# Patient Record
Sex: MALE | Race: WHITE | NOT HISPANIC OR LATINO | Employment: FULL TIME | URBAN - METROPOLITAN AREA
[De-identification: names, ages, dates, MRNs, and addresses within clinical notes are randomized per-mention and may not be internally consistent; named-entity substitution may affect disease eponyms.]

---

## 2017-11-27 ENCOUNTER — TELEPHONE (OUTPATIENT)
Dept: FAMILY MEDICINE CLINIC | Facility: CLINIC | Age: 32
End: 2017-11-27

## 2017-11-27 ENCOUNTER — OFFICE VISIT (OUTPATIENT)
Dept: FAMILY MEDICINE CLINIC | Facility: CLINIC | Age: 32
End: 2017-11-27

## 2017-11-27 VITALS
DIASTOLIC BLOOD PRESSURE: 72 MMHG | WEIGHT: 227 LBS | BODY MASS INDEX: 30.75 KG/M2 | HEIGHT: 72 IN | OXYGEN SATURATION: 96 % | SYSTOLIC BLOOD PRESSURE: 120 MMHG | TEMPERATURE: 98.5 F | HEART RATE: 60 BPM

## 2017-11-27 DIAGNOSIS — R74.8 ELEVATED LIVER ENZYMES: Primary | ICD-10-CM

## 2017-11-27 DIAGNOSIS — J30.2 CHRONIC SEASONAL ALLERGIC RHINITIS, UNSPECIFIED TRIGGER: ICD-10-CM

## 2017-11-27 DIAGNOSIS — J20.9 ACUTE BRONCHITIS, UNSPECIFIED ORGANISM: Primary | ICD-10-CM

## 2017-11-27 DIAGNOSIS — J01.00 ACUTE MAXILLARY SINUSITIS, RECURRENCE NOT SPECIFIED: ICD-10-CM

## 2017-11-27 DIAGNOSIS — Z13.1 SCREENING FOR DIABETES MELLITUS: ICD-10-CM

## 2017-11-27 LAB
ALBUMIN SERPL-MCNC: 4.8 G/DL (ref 3.5–5.2)
ALBUMIN/GLOB SERPL: 1.7 G/DL
ALP SERPL-CCNC: 34 U/L (ref 39–117)
ALT SERPL W P-5'-P-CCNC: 50 U/L (ref 1–41)
ANION GAP SERPL CALCULATED.3IONS-SCNC: 11.4 MMOL/L
AST SERPL-CCNC: 21 U/L (ref 1–40)
BILIRUB SERPL-MCNC: 0.3 MG/DL (ref 0.1–1.2)
BUN BLD-MCNC: 13 MG/DL (ref 6–20)
BUN/CREAT SERPL: 12.1 (ref 7–25)
CALCIUM SPEC-SCNC: 9.6 MG/DL (ref 8.6–10.5)
CHLORIDE SERPL-SCNC: 105 MMOL/L (ref 98–107)
CO2 SERPL-SCNC: 26.6 MMOL/L (ref 22–29)
CREAT BLD-MCNC: 1.07 MG/DL (ref 0.76–1.27)
ERYTHROCYTE [DISTWIDTH] IN BLOOD BY AUTOMATED COUNT: 13.4 % (ref 4.5–15)
GFR SERPL CREATININE-BSD FRML MDRD: 80 ML/MIN/1.73
GLOBULIN UR ELPH-MCNC: 2.9 GM/DL
GLUCOSE BLD-MCNC: 101 MG/DL (ref 65–99)
HCT VFR BLD AUTO: 46.2 % (ref 35–60)
HGB BLD-MCNC: 14.6 G/DL (ref 13.5–18)
LYMPHOCYTES # BLD AUTO: 2.1 10*3/MM3 (ref 1.2–3.4)
LYMPHOCYTES NFR BLD AUTO: 31 % (ref 21–51)
MCH RBC QN AUTO: 26.8 PG (ref 26.1–33.1)
MCHC RBC AUTO-ENTMCNC: 31.7 G/DL (ref 33–37)
MCV RBC AUTO: 84.7 FL (ref 80–99)
MONOCYTES # BLD AUTO: 0.4 10*3/MM3 (ref 0.1–0.6)
MONOCYTES NFR BLD AUTO: 6.2 % (ref 2–9)
NEUTROPHILS # BLD AUTO: 4.3 10*3/MM3 (ref 1.4–6.5)
NEUTROPHILS NFR BLD AUTO: 62.8 % (ref 42–75)
PLATELET # BLD AUTO: 222 10*3/MM3 (ref 150–450)
PMV BLD AUTO: 9.1 FL (ref 7.1–10.5)
POTASSIUM BLD-SCNC: 4.7 MMOL/L (ref 3.5–5.2)
PROT SERPL-MCNC: 7.7 G/DL (ref 6–8.5)
RBC # BLD AUTO: 5.45 10*6/MM3 (ref 4–6)
SODIUM BLD-SCNC: 143 MMOL/L (ref 136–145)
WBC NRBC COR # BLD: 6.9 10*3/MM3 (ref 4.5–10)

## 2017-11-27 PROCEDURE — 99203 OFFICE O/P NEW LOW 30 MIN: CPT | Performed by: NURSE PRACTITIONER

## 2017-11-27 PROCEDURE — 80053 COMPREHEN METABOLIC PANEL: CPT | Performed by: NURSE PRACTITIONER

## 2017-11-27 PROCEDURE — 36415 COLL VENOUS BLD VENIPUNCTURE: CPT | Performed by: NURSE PRACTITIONER

## 2017-11-27 PROCEDURE — 85025 COMPLETE CBC W/AUTO DIFF WBC: CPT | Performed by: NURSE PRACTITIONER

## 2017-11-27 RX ORDER — FLUTICASONE PROPIONATE 50 MCG
SPRAY, SUSPENSION (ML) NASAL
Refills: 11 | COMMUNITY
Start: 2017-09-08

## 2017-11-27 RX ORDER — ALBUTEROL SULFATE 90 UG/1
2 AEROSOL, METERED RESPIRATORY (INHALATION) EVERY 4 HOURS PRN
Qty: 1 INHALER | Refills: 0 | Status: SHIPPED | OUTPATIENT
Start: 2017-11-27

## 2017-11-27 RX ORDER — AMOXICILLIN 875 MG/1
875 TABLET, COATED ORAL 2 TIMES DAILY
Qty: 14 TABLET | Refills: 0 | Status: SHIPPED | OUTPATIENT
Start: 2017-11-27 | End: 2018-02-15

## 2017-12-05 ENCOUNTER — TELEPHONE (OUTPATIENT)
Dept: FAMILY MEDICINE CLINIC | Facility: CLINIC | Age: 32
End: 2017-12-05

## 2017-12-05 ENCOUNTER — LAB (OUTPATIENT)
Dept: FAMILY MEDICINE CLINIC | Facility: CLINIC | Age: 32
End: 2017-12-05

## 2017-12-05 DIAGNOSIS — R74.8 ELEVATED LIVER ENZYMES: ICD-10-CM

## 2017-12-05 DIAGNOSIS — R74.8 ELEVATED LIVER ENZYMES: Primary | ICD-10-CM

## 2017-12-05 LAB
HAV IGM SERPL QL IA: NORMAL
HBV CORE IGM SERPL QL IA: NORMAL
HBV SURFACE AG SERPL QL IA: NORMAL
HCV AB SER DONR QL: NORMAL

## 2017-12-05 PROCEDURE — 36415 COLL VENOUS BLD VENIPUNCTURE: CPT | Performed by: NURSE PRACTITIONER

## 2017-12-05 PROCEDURE — 80074 ACUTE HEPATITIS PANEL: CPT | Performed by: NURSE PRACTITIONER

## 2017-12-05 RX ORDER — AZITHROMYCIN 250 MG/1
TABLET, FILM COATED ORAL
Qty: 6 TABLET | Refills: 0 | Status: SHIPPED | OUTPATIENT
Start: 2017-12-05 | End: 2018-02-15

## 2018-02-15 ENCOUNTER — OFFICE VISIT (OUTPATIENT)
Dept: FAMILY MEDICINE CLINIC | Facility: CLINIC | Age: 33
End: 2018-02-15

## 2018-02-15 VITALS
WEIGHT: 230 LBS | HEART RATE: 56 BPM | BODY MASS INDEX: 31.15 KG/M2 | HEIGHT: 72 IN | SYSTOLIC BLOOD PRESSURE: 110 MMHG | TEMPERATURE: 98.4 F | DIASTOLIC BLOOD PRESSURE: 78 MMHG | OXYGEN SATURATION: 94 %

## 2018-02-15 DIAGNOSIS — J34.89 SINUS PRESSURE: ICD-10-CM

## 2018-02-15 DIAGNOSIS — J30.2 CHRONIC SEASONAL ALLERGIC RHINITIS, UNSPECIFIED TRIGGER: ICD-10-CM

## 2018-02-15 DIAGNOSIS — R74.8 ELEVATED LIVER ENZYMES: ICD-10-CM

## 2018-02-15 DIAGNOSIS — R05.3 PERSISTENT COUGH FOR 3 WEEKS OR LONGER: Primary | ICD-10-CM

## 2018-02-15 LAB
ALBUMIN SERPL-MCNC: 4.8 G/DL (ref 3.5–5.2)
ALBUMIN/GLOB SERPL: 1.6 G/DL
ALP SERPL-CCNC: 38 U/L (ref 39–117)
ALT SERPL W P-5'-P-CCNC: 57 U/L (ref 1–41)
ANION GAP SERPL CALCULATED.3IONS-SCNC: 11.7 MMOL/L
AST SERPL-CCNC: 29 U/L (ref 1–40)
BILIRUB CONJ SERPL-MCNC: <0.2 MG/DL (ref 0–0.3)
BILIRUB SERPL-MCNC: 0.3 MG/DL (ref 0.1–1.2)
BUN BLD-MCNC: 12 MG/DL (ref 6–20)
BUN/CREAT SERPL: 11.7 (ref 7–25)
CALCIUM SPEC-SCNC: 9.9 MG/DL (ref 8.6–10.5)
CHLORIDE SERPL-SCNC: 102 MMOL/L (ref 98–107)
CO2 SERPL-SCNC: 28.3 MMOL/L (ref 22–29)
CREAT BLD-MCNC: 1.03 MG/DL (ref 0.76–1.27)
ERYTHROCYTE [DISTWIDTH] IN BLOOD BY AUTOMATED COUNT: 12.5 % (ref 4.5–15)
GFR SERPL CREATININE-BSD FRML MDRD: 83 ML/MIN/1.73
GLOBULIN UR ELPH-MCNC: 3 GM/DL
GLUCOSE BLD-MCNC: 105 MG/DL (ref 65–99)
HCT VFR BLD AUTO: 47 % (ref 35–60)
HGB BLD-MCNC: 15.6 G/DL (ref 13.5–18)
LYMPHOCYTES # BLD AUTO: 2.4 10*3/MM3 (ref 1.2–3.4)
LYMPHOCYTES NFR BLD AUTO: 27.2 % (ref 21–51)
MCH RBC QN AUTO: 27.8 PG (ref 26.1–33.1)
MCHC RBC AUTO-ENTMCNC: 33.2 G/DL (ref 33–37)
MCV RBC AUTO: 83.8 FL (ref 80–99)
MONOCYTES # BLD AUTO: 0.4 10*3/MM3 (ref 0.1–0.6)
MONOCYTES NFR BLD AUTO: 4.3 % (ref 2–9)
NEUTROPHILS # BLD AUTO: 6 10*3/MM3 (ref 1.4–6.5)
NEUTROPHILS NFR BLD AUTO: 68.5 % (ref 42–75)
PLATELET # BLD AUTO: 266 10*3/MM3 (ref 150–450)
PMV BLD AUTO: 8 FL (ref 7.1–10.5)
POTASSIUM BLD-SCNC: 4.7 MMOL/L (ref 3.5–5.2)
PROT SERPL-MCNC: 7.8 G/DL (ref 6–8.5)
RBC # BLD AUTO: 5.61 10*6/MM3 (ref 4–6)
SODIUM BLD-SCNC: 142 MMOL/L (ref 136–145)
WBC NRBC COR # BLD: 8.8 10*3/MM3 (ref 4.5–10)

## 2018-02-15 PROCEDURE — 82248 BILIRUBIN DIRECT: CPT | Performed by: NURSE PRACTITIONER

## 2018-02-15 PROCEDURE — 85025 COMPLETE CBC W/AUTO DIFF WBC: CPT | Performed by: NURSE PRACTITIONER

## 2018-02-15 PROCEDURE — 80053 COMPREHEN METABOLIC PANEL: CPT | Performed by: NURSE PRACTITIONER

## 2018-02-15 PROCEDURE — 99213 OFFICE O/P EST LOW 20 MIN: CPT | Performed by: NURSE PRACTITIONER

## 2018-02-15 PROCEDURE — 36415 COLL VENOUS BLD VENIPUNCTURE: CPT | Performed by: NURSE PRACTITIONER

## 2018-02-15 PROCEDURE — 71046 X-RAY EXAM CHEST 2 VIEWS: CPT | Performed by: NURSE PRACTITIONER

## 2018-02-15 RX ORDER — CEFUROXIME AXETIL 250 MG/1
250 TABLET ORAL 2 TIMES DAILY
Qty: 20 TABLET | Refills: 0 | Status: SHIPPED | OUTPATIENT
Start: 2018-02-15 | End: 2019-12-28

## 2018-02-15 RX ORDER — MONTELUKAST SODIUM 10 MG/1
10 TABLET ORAL NIGHTLY
Qty: 30 TABLET | Refills: 1 | Status: SHIPPED | OUTPATIENT
Start: 2018-02-15 | End: 2018-04-19 | Stop reason: SDUPTHER

## 2018-02-15 RX ORDER — BENZONATATE 100 MG/1
100 CAPSULE ORAL 3 TIMES DAILY PRN
Qty: 21 CAPSULE | Refills: 0 | Status: SHIPPED | OUTPATIENT
Start: 2018-02-15 | End: 2019-12-28

## 2018-02-16 ENCOUNTER — TELEPHONE (OUTPATIENT)
Dept: FAMILY MEDICINE CLINIC | Facility: CLINIC | Age: 33
End: 2018-02-16

## 2018-02-19 ENCOUNTER — TELEPHONE (OUTPATIENT)
Dept: FAMILY MEDICINE CLINIC | Facility: CLINIC | Age: 33
End: 2018-02-19

## 2018-02-19 DIAGNOSIS — R79.89 ELEVATED LFTS: Primary | ICD-10-CM

## 2018-04-19 RX ORDER — MONTELUKAST SODIUM 10 MG/1
10 TABLET ORAL NIGHTLY
Qty: 30 TABLET | Refills: 1 | Status: SHIPPED | OUTPATIENT
Start: 2018-04-19

## 2018-05-09 ENCOUNTER — CLINICAL SUPPORT (OUTPATIENT)
Dept: FAMILY MEDICINE CLINIC | Facility: CLINIC | Age: 33
End: 2018-05-09

## 2018-05-09 PROCEDURE — 90471 IMMUNIZATION ADMIN: CPT | Performed by: NURSE PRACTITIONER

## 2018-05-09 PROCEDURE — 90632 HEPA VACCINE ADULT IM: CPT | Performed by: NURSE PRACTITIONER

## 2018-11-07 ENCOUNTER — CLINICAL SUPPORT (OUTPATIENT)
Dept: FAMILY MEDICINE CLINIC | Facility: CLINIC | Age: 33
End: 2018-11-07

## 2018-11-07 PROCEDURE — 90471 IMMUNIZATION ADMIN: CPT | Performed by: NURSE PRACTITIONER

## 2018-11-07 PROCEDURE — 90632 HEPA VACCINE ADULT IM: CPT | Performed by: NURSE PRACTITIONER

## 2019-12-28 ENCOUNTER — OFFICE VISIT (OUTPATIENT)
Dept: RETAIL CLINIC | Facility: CLINIC | Age: 34
End: 2019-12-28

## 2019-12-28 VITALS
SYSTOLIC BLOOD PRESSURE: 108 MMHG | HEART RATE: 96 BPM | DIASTOLIC BLOOD PRESSURE: 68 MMHG | TEMPERATURE: 98.3 F | OXYGEN SATURATION: 99 %

## 2019-12-28 DIAGNOSIS — J06.9 ACUTE URI: ICD-10-CM

## 2019-12-28 DIAGNOSIS — J32.4 PANSINUSITIS, UNSPECIFIED CHRONICITY: Primary | ICD-10-CM

## 2019-12-28 PROCEDURE — 99213 OFFICE O/P EST LOW 20 MIN: CPT | Performed by: NURSE PRACTITIONER

## 2019-12-28 RX ORDER — AMOXICILLIN 500 MG/1
500 CAPSULE ORAL 2 TIMES DAILY
Qty: 20 CAPSULE | Refills: 0 | Status: SHIPPED | OUTPATIENT
Start: 2019-12-28 | End: 2020-01-07

## 2019-12-28 RX ORDER — PREDNISONE 20 MG/1
20 TABLET ORAL 2 TIMES DAILY
Qty: 14 TABLET | Refills: 0 | Status: SHIPPED | OUTPATIENT
Start: 2019-12-28 | End: 2020-01-04

## 2019-12-28 RX ORDER — GUAIFENESIN 600 MG/1
600 TABLET, EXTENDED RELEASE ORAL 2 TIMES DAILY
Qty: 28 TABLET | Refills: 0 | Status: SHIPPED | OUTPATIENT
Start: 2019-12-28 | End: 2020-01-11

## 2019-12-28 RX ORDER — BROMPHENIRAMINE MALEATE, PSEUDOEPHEDRINE HYDROCHLORIDE, AND DEXTROMETHORPHAN HYDROBROMIDE 2; 30; 10 MG/5ML; MG/5ML; MG/5ML
SYRUP ORAL
Qty: 240 ML | Refills: 0 | Status: SHIPPED | OUTPATIENT
Start: 2019-12-28 | End: 2020-01-22

## 2020-01-22 ENCOUNTER — OFFICE VISIT (OUTPATIENT)
Dept: FAMILY MEDICINE CLINIC | Facility: CLINIC | Age: 35
End: 2020-01-22

## 2020-01-22 VITALS
WEIGHT: 249.2 LBS | OXYGEN SATURATION: 97 % | HEIGHT: 72 IN | HEART RATE: 88 BPM | TEMPERATURE: 98.7 F | SYSTOLIC BLOOD PRESSURE: 104 MMHG | BODY MASS INDEX: 33.75 KG/M2 | DIASTOLIC BLOOD PRESSURE: 68 MMHG

## 2020-01-22 DIAGNOSIS — J30.2 SEASONAL ALLERGIC RHINITIS, UNSPECIFIED TRIGGER: ICD-10-CM

## 2020-01-22 DIAGNOSIS — J01.00 ACUTE MAXILLARY SINUSITIS, RECURRENCE NOT SPECIFIED: Primary | ICD-10-CM

## 2020-01-22 DIAGNOSIS — J45.40 MODERATE PERSISTENT EXTRINSIC ASTHMA WITHOUT COMPLICATION: ICD-10-CM

## 2020-01-22 PROCEDURE — 99213 OFFICE O/P EST LOW 20 MIN: CPT | Performed by: NURSE PRACTITIONER

## 2020-01-22 RX ORDER — CEFUROXIME AXETIL 250 MG/1
250 TABLET ORAL 2 TIMES DAILY
Qty: 20 TABLET | Refills: 0 | Status: SHIPPED | OUTPATIENT
Start: 2020-01-22 | End: 2020-02-17

## 2020-02-17 ENCOUNTER — OFFICE VISIT (OUTPATIENT)
Dept: FAMILY MEDICINE CLINIC | Facility: CLINIC | Age: 35
End: 2020-02-17

## 2020-02-17 VITALS
BODY MASS INDEX: 33.86 KG/M2 | HEART RATE: 96 BPM | HEIGHT: 72 IN | OXYGEN SATURATION: 98 % | DIASTOLIC BLOOD PRESSURE: 72 MMHG | TEMPERATURE: 98.2 F | WEIGHT: 250 LBS | SYSTOLIC BLOOD PRESSURE: 110 MMHG

## 2020-02-17 DIAGNOSIS — Z83.3 FAMILY HISTORY OF DIABETES MELLITUS: ICD-10-CM

## 2020-02-17 DIAGNOSIS — G89.29 CHRONIC PAIN OF BOTH KNEES: ICD-10-CM

## 2020-02-17 DIAGNOSIS — M25.561 CHRONIC PAIN OF BOTH KNEES: ICD-10-CM

## 2020-02-17 DIAGNOSIS — J45.20 MILD INTERMITTENT ASTHMA, UNSPECIFIED WHETHER COMPLICATED: ICD-10-CM

## 2020-02-17 DIAGNOSIS — M25.50 ARTHRALGIA, UNSPECIFIED JOINT: ICD-10-CM

## 2020-02-17 DIAGNOSIS — M25.562 CHRONIC PAIN OF BOTH KNEES: ICD-10-CM

## 2020-02-17 DIAGNOSIS — G89.29 CHRONIC PAIN OF BOTH ANKLES: ICD-10-CM

## 2020-02-17 DIAGNOSIS — M25.571 CHRONIC PAIN OF BOTH ANKLES: ICD-10-CM

## 2020-02-17 DIAGNOSIS — M25.572 CHRONIC PAIN OF BOTH ANKLES: ICD-10-CM

## 2020-02-17 DIAGNOSIS — Z00.00 ANNUAL PHYSICAL EXAM: Primary | ICD-10-CM

## 2020-02-17 LAB
ALBUMIN SERPL-MCNC: 4.8 G/DL (ref 3.5–5.2)
ALBUMIN/GLOB SERPL: 2 G/DL
ALP SERPL-CCNC: 31 U/L (ref 39–117)
ALT SERPL W P-5'-P-CCNC: 146 U/L (ref 1–41)
ANION GAP SERPL CALCULATED.3IONS-SCNC: 11.2 MMOL/L (ref 5–15)
AST SERPL-CCNC: 57 U/L (ref 1–40)
BILIRUB SERPL-MCNC: 0.3 MG/DL (ref 0.2–1.2)
BUN BLD-MCNC: 10 MG/DL (ref 6–20)
BUN/CREAT SERPL: 10.6 (ref 7–25)
CALCIUM SPEC-SCNC: 9.5 MG/DL (ref 8.6–10.5)
CHLORIDE SERPL-SCNC: 104 MMOL/L (ref 98–107)
CHOLEST SERPL-MCNC: 168 MG/DL (ref 0–200)
CHROMATIN AB SERPL-ACNC: <10 IU/ML (ref 0–14)
CO2 SERPL-SCNC: 27.8 MMOL/L (ref 22–29)
CREAT BLD-MCNC: 0.94 MG/DL (ref 0.76–1.27)
CRP SERPL-MCNC: 0.3 MG/DL (ref 0–0.5)
ERYTHROCYTE [DISTWIDTH] IN BLOOD BY AUTOMATED COUNT: 13.7 % (ref 12.3–15.4)
ERYTHROCYTE [SEDIMENTATION RATE] IN BLOOD: 2 MM/HR (ref 0–15)
GFR SERPL CREATININE-BSD FRML MDRD: 91 ML/MIN/1.73
GLOBULIN UR ELPH-MCNC: 2.4 GM/DL
GLUCOSE BLD-MCNC: 100 MG/DL (ref 65–99)
HBA1C MFR BLD: 5.86 % (ref 4.8–5.6)
HCT VFR BLD AUTO: 44.8 % (ref 37.5–51)
HDLC SERPL-MCNC: 49 MG/DL (ref 40–60)
HGB BLD-MCNC: 14.9 G/DL (ref 13–17.7)
LDLC SERPL CALC-MCNC: 96 MG/DL (ref 0–100)
LDLC/HDLC SERPL: 1.96 {RATIO}
LYMPHOCYTES # BLD AUTO: 1.7 10*3/MM3 (ref 0.7–3.1)
LYMPHOCYTES NFR BLD AUTO: 26.2 % (ref 19.6–45.3)
MCH RBC QN AUTO: 27.9 PG (ref 26.6–33)
MCHC RBC AUTO-ENTMCNC: 33.2 G/DL (ref 31.5–35.7)
MCV RBC AUTO: 83.9 FL (ref 79–97)
MONOCYTES # BLD AUTO: 0.3 10*3/MM3 (ref 0.1–0.9)
MONOCYTES NFR BLD AUTO: 5.2 % (ref 5–12)
NEUTROPHILS # BLD AUTO: 4.5 10*3/MM3 (ref 1.7–7)
NEUTROPHILS NFR BLD AUTO: 68.6 % (ref 42.7–76)
PLATELET # BLD AUTO: 223 10*3/MM3 (ref 140–450)
PMV BLD AUTO: 8.3 FL (ref 6–12)
POTASSIUM BLD-SCNC: 4.5 MMOL/L (ref 3.5–5.2)
PROT SERPL-MCNC: 7.2 G/DL (ref 6–8.5)
RBC # BLD AUTO: 5.34 10*6/MM3 (ref 4.14–5.8)
SODIUM BLD-SCNC: 143 MMOL/L (ref 136–145)
TRIGL SERPL-MCNC: 114 MG/DL (ref 0–150)
TSH SERPL DL<=0.05 MIU/L-ACNC: 1.95 UIU/ML (ref 0.27–4.2)
URATE SERPL-MCNC: 6.2 MG/DL (ref 3.4–7)
VLDLC SERPL-MCNC: 22.8 MG/DL (ref 5–40)
WBC NRBC COR # BLD: 6.6 10*3/MM3 (ref 3.4–10.8)

## 2020-02-17 PROCEDURE — 99395 PREV VISIT EST AGE 18-39: CPT | Performed by: NURSE PRACTITIONER

## 2020-02-17 PROCEDURE — 73610 X-RAY EXAM OF ANKLE: CPT | Performed by: NURSE PRACTITIONER

## 2020-02-17 PROCEDURE — 80053 COMPREHEN METABOLIC PANEL: CPT | Performed by: NURSE PRACTITIONER

## 2020-02-17 PROCEDURE — 86431 RHEUMATOID FACTOR QUANT: CPT | Performed by: NURSE PRACTITIONER

## 2020-02-17 PROCEDURE — 36415 COLL VENOUS BLD VENIPUNCTURE: CPT | Performed by: NURSE PRACTITIONER

## 2020-02-17 PROCEDURE — 84443 ASSAY THYROID STIM HORMONE: CPT | Performed by: NURSE PRACTITIONER

## 2020-02-17 PROCEDURE — 80061 LIPID PANEL: CPT | Performed by: NURSE PRACTITIONER

## 2020-02-17 PROCEDURE — 85025 COMPLETE CBC W/AUTO DIFF WBC: CPT | Performed by: NURSE PRACTITIONER

## 2020-02-17 PROCEDURE — 86140 C-REACTIVE PROTEIN: CPT | Performed by: NURSE PRACTITIONER

## 2020-02-17 PROCEDURE — 83036 HEMOGLOBIN GLYCOSYLATED A1C: CPT | Performed by: NURSE PRACTITIONER

## 2020-02-17 PROCEDURE — 84550 ASSAY OF BLOOD/URIC ACID: CPT | Performed by: NURSE PRACTITIONER

## 2020-02-17 PROCEDURE — 73560 X-RAY EXAM OF KNEE 1 OR 2: CPT | Performed by: NURSE PRACTITIONER

## 2020-02-17 PROCEDURE — 85652 RBC SED RATE AUTOMATED: CPT | Performed by: NURSE PRACTITIONER

## 2020-02-17 RX ORDER — CETIRIZINE HYDROCHLORIDE 10 MG/1
10 TABLET ORAL DAILY
COMMUNITY

## 2020-02-18 LAB — ANA SER QL: NEGATIVE

## 2021-04-11 ENCOUNTER — IMMUNIZATION (OUTPATIENT)
Dept: VACCINE CLINIC | Facility: HOSPITAL | Age: 36
End: 2021-04-11

## 2021-04-11 PROCEDURE — 0001A: CPT | Performed by: INTERNAL MEDICINE

## 2021-04-11 PROCEDURE — 91300 HC SARSCOV02 VAC 30MCG/0.3ML IM: CPT | Performed by: INTERNAL MEDICINE

## 2021-05-02 ENCOUNTER — IMMUNIZATION (OUTPATIENT)
Dept: VACCINE CLINIC | Facility: HOSPITAL | Age: 36
End: 2021-05-02

## 2021-05-02 PROCEDURE — 0002A: CPT | Performed by: INTERNAL MEDICINE

## 2021-05-02 PROCEDURE — 91300 HC SARSCOV02 VAC 30MCG/0.3ML IM: CPT | Performed by: INTERNAL MEDICINE

## 2021-12-04 ENCOUNTER — IMMUNIZATION (OUTPATIENT)
Dept: VACCINE CLINIC | Facility: HOSPITAL | Age: 36
End: 2021-12-04

## 2021-12-04 PROCEDURE — 91300 HC SARSCOV02 VAC 30MCG/0.3ML IM: CPT | Performed by: INTERNAL MEDICINE

## 2021-12-04 PROCEDURE — 0004A HC ADM SARSCOV2 30MCG/0.3ML BOOSTER: CPT | Performed by: INTERNAL MEDICINE

## 2021-12-12 ENCOUNTER — E-VISIT (OUTPATIENT)
Dept: FAMILY MEDICINE CLINIC | Facility: TELEHEALTH | Age: 36
End: 2021-12-12

## 2021-12-12 DIAGNOSIS — R43.2 LOSS OF TASTE: Primary | ICD-10-CM

## 2021-12-12 DIAGNOSIS — R43.0 LOSS OF SMELL: ICD-10-CM

## 2021-12-12 PROCEDURE — BRIGHTMDVISIT: Performed by: NURSE PRACTITIONER

## 2022-03-07 ENCOUNTER — TELEPHONE (OUTPATIENT)
Dept: FAMILY MEDICINE CLINIC | Facility: CLINIC | Age: 37
End: 2022-03-07

## 2022-03-09 ENCOUNTER — OFFICE VISIT (OUTPATIENT)
Dept: FAMILY MEDICINE CLINIC | Facility: CLINIC | Age: 37
End: 2022-03-09

## 2022-03-09 VITALS
DIASTOLIC BLOOD PRESSURE: 70 MMHG | BODY MASS INDEX: 31.1 KG/M2 | TEMPERATURE: 98.2 F | HEART RATE: 67 BPM | HEIGHT: 72 IN | OXYGEN SATURATION: 99 % | WEIGHT: 229.6 LBS | SYSTOLIC BLOOD PRESSURE: 124 MMHG

## 2022-03-09 DIAGNOSIS — R73.03 PRE-DIABETES: ICD-10-CM

## 2022-03-09 DIAGNOSIS — Z13.220 SCREENING FOR HYPERLIPIDEMIA: ICD-10-CM

## 2022-03-09 DIAGNOSIS — Z00.00 ENCOUNTER FOR ANNUAL PHYSICAL EXAM: Primary | ICD-10-CM

## 2022-03-09 DIAGNOSIS — J30.2 SEASONAL ALLERGIES: ICD-10-CM

## 2022-03-09 DIAGNOSIS — J45.20 MILD INTERMITTENT ASTHMA WITHOUT COMPLICATION: ICD-10-CM

## 2022-03-09 PROBLEM — J30.9 ALLERGIC RHINITIS: Status: ACTIVE | Noted: 2022-03-09

## 2022-03-09 PROCEDURE — 99395 PREV VISIT EST AGE 18-39: CPT | Performed by: NURSE PRACTITIONER

## 2022-03-09 PROCEDURE — 99213 OFFICE O/P EST LOW 20 MIN: CPT | Performed by: NURSE PRACTITIONER

## 2022-03-18 ENCOUNTER — LAB (OUTPATIENT)
Dept: FAMILY MEDICINE CLINIC | Facility: CLINIC | Age: 37
End: 2022-03-18

## 2022-03-18 LAB
ALBUMIN SERPL-MCNC: 5.3 G/DL (ref 3.5–5.2)
ALBUMIN/GLOB SERPL: 3.1 G/DL
ALP SERPL-CCNC: 35 U/L (ref 39–117)
ALT SERPL W P-5'-P-CCNC: 26 U/L (ref 1–41)
ANION GAP SERPL CALCULATED.3IONS-SCNC: 10 MMOL/L (ref 5–15)
AST SERPL-CCNC: 17 U/L (ref 1–40)
BILIRUB SERPL-MCNC: 0.5 MG/DL (ref 0–1.2)
BUN SERPL-MCNC: 15 MG/DL (ref 6–20)
BUN/CREAT SERPL: 12.8 (ref 7–25)
CALCIUM SPEC-SCNC: 9.5 MG/DL (ref 8.6–10.5)
CHLORIDE SERPL-SCNC: 104 MMOL/L (ref 98–107)
CHOLEST SERPL-MCNC: 164 MG/DL (ref 0–200)
CO2 SERPL-SCNC: 28 MMOL/L (ref 22–29)
CREAT SERPL-MCNC: 1.17 MG/DL (ref 0.76–1.27)
EGFRCR SERPLBLD CKD-EPI 2021: 81.8 ML/MIN/1.73
ERYTHROCYTE [DISTWIDTH] IN BLOOD BY AUTOMATED COUNT: 13 % (ref 12.3–15.4)
GLOBULIN UR ELPH-MCNC: 1.7 GM/DL
GLUCOSE SERPL-MCNC: 91 MG/DL (ref 65–99)
HBA1C MFR BLD: 5.6 % (ref 4.8–5.6)
HCT VFR BLD AUTO: 43 % (ref 37.5–51)
HDLC SERPL-MCNC: 40 MG/DL (ref 40–60)
HGB BLD-MCNC: 14.6 G/DL (ref 13–17.7)
LDLC SERPL CALC-MCNC: 106 MG/DL (ref 0–100)
LDLC/HDLC SERPL: 2.63 {RATIO}
LYMPHOCYTES # BLD AUTO: 1.4 10*3/MM3 (ref 0.7–3.1)
LYMPHOCYTES NFR BLD AUTO: 17.3 % (ref 19.6–45.3)
MCH RBC QN AUTO: 28.4 PG (ref 26.6–33)
MCHC RBC AUTO-ENTMCNC: 34.1 G/DL (ref 31.5–35.7)
MCV RBC AUTO: 83.2 FL (ref 79–97)
MONOCYTES # BLD AUTO: 0.2 10*3/MM3 (ref 0.1–0.9)
MONOCYTES NFR BLD AUTO: 2.2 % (ref 5–12)
NEUTROPHILS NFR BLD AUTO: 6.4 10*3/MM3 (ref 1.7–7)
NEUTROPHILS NFR BLD AUTO: 80.5 % (ref 42.7–76)
PLATELET # BLD AUTO: 177 10*3/MM3 (ref 140–450)
PMV BLD AUTO: 9.3 FL (ref 6–12)
POTASSIUM SERPL-SCNC: 4.7 MMOL/L (ref 3.5–5.2)
PROT SERPL-MCNC: 7 G/DL (ref 6–8.5)
RBC # BLD AUTO: 5.17 10*6/MM3 (ref 4.14–5.8)
SODIUM SERPL-SCNC: 142 MMOL/L (ref 136–145)
TRIGL SERPL-MCNC: 94 MG/DL (ref 0–150)
VLDLC SERPL-MCNC: 18 MG/DL (ref 5–40)
WBC NRBC COR # BLD: 8 10*3/MM3 (ref 3.4–10.8)

## 2022-03-18 PROCEDURE — 83036 HEMOGLOBIN GLYCOSYLATED A1C: CPT | Performed by: NURSE PRACTITIONER

## 2022-03-18 PROCEDURE — 80061 LIPID PANEL: CPT | Performed by: NURSE PRACTITIONER

## 2022-03-18 PROCEDURE — 36415 COLL VENOUS BLD VENIPUNCTURE: CPT | Performed by: NURSE PRACTITIONER

## 2022-03-18 PROCEDURE — 85025 COMPLETE CBC W/AUTO DIFF WBC: CPT | Performed by: NURSE PRACTITIONER

## 2022-03-18 PROCEDURE — 80053 COMPREHEN METABOLIC PANEL: CPT | Performed by: NURSE PRACTITIONER

## 2022-03-20 DIAGNOSIS — R79.89 ABNORMAL CBC: Primary | ICD-10-CM

## 2022-04-12 ENCOUNTER — LAB (OUTPATIENT)
Dept: FAMILY MEDICINE CLINIC | Facility: CLINIC | Age: 37
End: 2022-04-12

## 2022-04-12 DIAGNOSIS — R79.89 ABNORMAL CBC: ICD-10-CM

## 2022-04-12 LAB
BASOPHILS # BLD AUTO: 0.02 10*3/MM3 (ref 0–0.2)
BASOPHILS NFR BLD AUTO: 0.4 % (ref 0–1.5)
DEPRECATED RDW RBC AUTO: 41.5 FL (ref 37–54)
EOSINOPHIL # BLD AUTO: 0.08 10*3/MM3 (ref 0–0.4)
EOSINOPHIL NFR BLD AUTO: 1.6 % (ref 0.3–6.2)
ERYTHROCYTE [DISTWIDTH] IN BLOOD BY AUTOMATED COUNT: 13.3 % (ref 12.3–15.4)
HCT VFR BLD AUTO: 43.3 % (ref 37.5–51)
HGB BLD-MCNC: 14.5 G/DL (ref 13–17.7)
IMM GRANULOCYTES # BLD AUTO: 0.02 10*3/MM3 (ref 0–0.05)
IMM GRANULOCYTES NFR BLD AUTO: 0.4 % (ref 0–0.5)
LYMPHOCYTES # BLD AUTO: 1.6 10*3/MM3 (ref 0.7–3.1)
LYMPHOCYTES NFR BLD AUTO: 31.8 % (ref 19.6–45.3)
MCH RBC QN AUTO: 28.4 PG (ref 26.6–33)
MCHC RBC AUTO-ENTMCNC: 33.5 G/DL (ref 31.5–35.7)
MCV RBC AUTO: 84.9 FL (ref 79–97)
MONOCYTES # BLD AUTO: 0.34 10*3/MM3 (ref 0.1–0.9)
MONOCYTES NFR BLD AUTO: 6.8 % (ref 5–12)
NEUTROPHILS NFR BLD AUTO: 2.97 10*3/MM3 (ref 1.7–7)
NEUTROPHILS NFR BLD AUTO: 59 % (ref 42.7–76)
NRBC BLD AUTO-RTO: 0 /100 WBC (ref 0–0.2)
PLATELET # BLD AUTO: 199 10*3/MM3 (ref 140–450)
PMV BLD AUTO: 11.8 FL (ref 6–12)
RBC # BLD AUTO: 5.1 10*6/MM3 (ref 4.14–5.8)
WBC NRBC COR # BLD: 5.03 10*3/MM3 (ref 3.4–10.8)

## 2022-04-12 PROCEDURE — 85025 COMPLETE CBC W/AUTO DIFF WBC: CPT | Performed by: NURSE PRACTITIONER

## 2022-04-12 PROCEDURE — 36415 COLL VENOUS BLD VENIPUNCTURE: CPT | Performed by: NURSE PRACTITIONER

## 2022-06-10 ENCOUNTER — OFFICE VISIT (OUTPATIENT)
Dept: FAMILY MEDICINE CLINIC | Facility: CLINIC | Age: 37
End: 2022-06-10

## 2022-06-10 VITALS
OXYGEN SATURATION: 96 % | WEIGHT: 223.4 LBS | DIASTOLIC BLOOD PRESSURE: 70 MMHG | BODY MASS INDEX: 30.26 KG/M2 | HEIGHT: 72 IN | TEMPERATURE: 97.5 F | SYSTOLIC BLOOD PRESSURE: 104 MMHG | HEART RATE: 58 BPM

## 2022-06-10 DIAGNOSIS — Z86.16 HISTORY OF COVID-19: ICD-10-CM

## 2022-06-10 DIAGNOSIS — Z09 FOLLOW-UP EXAM: Primary | ICD-10-CM

## 2022-06-10 DIAGNOSIS — J30.2 SEASONAL ALLERGIES: ICD-10-CM

## 2022-06-10 PROCEDURE — 99213 OFFICE O/P EST LOW 20 MIN: CPT | Performed by: NURSE PRACTITIONER

## 2022-06-10 RX ORDER — IBUPROFEN 200 MG
200 TABLET ORAL EVERY 6 HOURS PRN
COMMUNITY

## 2023-04-28 ENCOUNTER — OFFICE VISIT (OUTPATIENT)
Dept: INTERNAL MEDICINE | Age: 38
End: 2023-04-28
Payer: COMMERCIAL

## 2023-04-28 VITALS
SYSTOLIC BLOOD PRESSURE: 120 MMHG | TEMPERATURE: 98.6 F | BODY MASS INDEX: 31.97 KG/M2 | OXYGEN SATURATION: 97 % | HEIGHT: 72 IN | HEART RATE: 55 BPM | DIASTOLIC BLOOD PRESSURE: 80 MMHG | WEIGHT: 236 LBS

## 2023-04-28 DIAGNOSIS — G47.9 SLEEP DISTURBANCE: ICD-10-CM

## 2023-04-28 DIAGNOSIS — Z76.89 ENCOUNTER TO ESTABLISH CARE: ICD-10-CM

## 2023-04-28 DIAGNOSIS — Z82.49 FAMILY HISTORY OF CARDIAC DISORDER IN FATHER: ICD-10-CM

## 2023-04-28 DIAGNOSIS — Z00.00 ANNUAL PHYSICAL EXAM: Primary | ICD-10-CM

## 2023-04-28 DIAGNOSIS — Z23 ENCOUNTER FOR IMMUNIZATION: ICD-10-CM

## 2023-04-29 LAB
ALBUMIN SERPL-MCNC: 4.6 G/DL (ref 4–5)
ALBUMIN/GLOB SERPL: 2 {RATIO} (ref 1.2–2.2)
ALP SERPL-CCNC: 33 IU/L (ref 44–121)
ALT SERPL-CCNC: 35 IU/L (ref 0–44)
AST SERPL-CCNC: 16 IU/L (ref 0–40)
BASOPHILS # BLD AUTO: 0 X10E3/UL (ref 0–0.2)
BASOPHILS NFR BLD AUTO: 0 %
BILIRUB SERPL-MCNC: 0.3 MG/DL (ref 0–1.2)
BUN SERPL-MCNC: 13 MG/DL (ref 6–20)
BUN/CREAT SERPL: 11 (ref 9–20)
CALCIUM SERPL-MCNC: 9.5 MG/DL (ref 8.7–10.2)
CHLORIDE SERPL-SCNC: 105 MMOL/L (ref 96–106)
CHOLEST SERPL-MCNC: 173 MG/DL (ref 100–199)
CHOLEST/HDLC SERPL: 4.6 RATIO (ref 0–5)
CO2 SERPL-SCNC: 28 MMOL/L (ref 20–29)
CREAT SERPL-MCNC: 1.17 MG/DL (ref 0.76–1.27)
EGFRCR SERPLBLD CKD-EPI 2021: 81 ML/MIN/1.73
EOSINOPHIL # BLD AUTO: 0.1 X10E3/UL (ref 0–0.4)
EOSINOPHIL NFR BLD AUTO: 2 %
ERYTHROCYTE [DISTWIDTH] IN BLOOD BY AUTOMATED COUNT: 13.5 % (ref 11.6–15.4)
GLOBULIN SER CALC-MCNC: 2.3 G/DL (ref 1.5–4.5)
GLUCOSE SERPL-MCNC: 104 MG/DL (ref 70–99)
GLUCOSE UR QL STRIP: NORMAL
HBA1C MFR BLD: 5.6 % (ref 4.8–5.6)
HCT VFR BLD AUTO: 41.3 % (ref 37.5–51)
HDLC SERPL-MCNC: 38 MG/DL
HGB BLD-MCNC: 14.2 G/DL (ref 13–17.7)
IMM GRANULOCYTES # BLD AUTO: 0 X10E3/UL (ref 0–0.1)
IMM GRANULOCYTES NFR BLD AUTO: 0 %
KETONES UR QL STRIP: NORMAL
LDLC SERPL CALC-MCNC: 111 MG/DL (ref 0–99)
LYMPHOCYTES # BLD AUTO: 1.7 X10E3/UL (ref 0.7–3.1)
LYMPHOCYTES NFR BLD AUTO: 32 %
MCH RBC QN AUTO: 28.7 PG (ref 26.6–33)
MCHC RBC AUTO-ENTMCNC: 34.4 G/DL (ref 31.5–35.7)
MCV RBC AUTO: 84 FL (ref 79–97)
MONOCYTES # BLD AUTO: 0.3 X10E3/UL (ref 0.1–0.9)
MONOCYTES NFR BLD AUTO: 6 %
NEUTROPHILS # BLD AUTO: 3.2 X10E3/UL (ref 1.4–7)
NEUTROPHILS NFR BLD AUTO: 60 %
PH UR STRIP: NORMAL [PH]
PLATELET # BLD AUTO: 222 X10E3/UL (ref 150–450)
POTASSIUM SERPL-SCNC: 4.4 MMOL/L (ref 3.5–5.2)
PROT SERPL-MCNC: 6.9 G/DL (ref 6–8.5)
PROT UR QL STRIP: NORMAL
RBC # BLD AUTO: 4.94 X10E6/UL (ref 4.14–5.8)
SODIUM SERPL-SCNC: 144 MMOL/L (ref 134–144)
SP GR UR STRIP: NORMAL
SPECIMEN STATUS: NORMAL
T4 FREE SERPL-MCNC: 1.31 NG/DL (ref 0.82–1.77)
TRIGL SERPL-MCNC: 133 MG/DL (ref 0–149)
TSH SERPL DL<=0.005 MIU/L-ACNC: 1.77 UIU/ML (ref 0.45–4.5)
UNABLE TO VOID: NORMAL
VLDLC SERPL CALC-MCNC: 24 MG/DL (ref 5–40)
WBC # BLD AUTO: 5.3 X10E3/UL (ref 3.4–10.8)

## 2023-05-11 ENCOUNTER — HOSPITAL ENCOUNTER (OUTPATIENT)
Dept: CARDIOLOGY | Facility: HOSPITAL | Age: 38
Discharge: HOME | End: 2023-05-11
Payer: COMMERCIAL

## 2023-05-11 VITALS — WEIGHT: 236 LBS | BODY MASS INDEX: 31.97 KG/M2 | HEIGHT: 72 IN

## 2023-05-11 DIAGNOSIS — Z82.49 FAMILY HISTORY OF CARDIAC DISORDER IN FATHER: ICD-10-CM

## 2023-05-11 LAB
BH CV STRESS BP STAGE 1: NORMAL
BH CV STRESS BP STAGE 2: NORMAL
BH CV STRESS BP STAGE 3: NORMAL
BH CV STRESS BP STAGE 4: NORMAL
BH CV STRESS DURATION MIN STAGE 1: 3
BH CV STRESS DURATION MIN STAGE 2: 3
BH CV STRESS DURATION MIN STAGE 3: 3
BH CV STRESS DURATION MIN STAGE 4: 0
BH CV STRESS DURATION SEC STAGE 1: 0
BH CV STRESS DURATION SEC STAGE 2: 0
BH CV STRESS DURATION SEC STAGE 3: 0
BH CV STRESS DURATION SEC STAGE 4: 7
BH CV STRESS GRADE STAGE 1: 10
BH CV STRESS GRADE STAGE 2: 12
BH CV STRESS GRADE STAGE 3: 14
BH CV STRESS GRADE STAGE 4: 16
BH CV STRESS HR STAGE 1: 124
BH CV STRESS HR STAGE 2: 148
BH CV STRESS HR STAGE 3: 176
BH CV STRESS HR STAGE 4: 176
BH CV STRESS METS STAGE 1: 5
BH CV STRESS METS STAGE 2: 7.5
BH CV STRESS METS STAGE 3: 10
BH CV STRESS METS STAGE 4: 13.5
BH CV STRESS PROTOCOL 1: NORMAL
BH CV STRESS RECOVERY BP: NORMAL MMHG
BH CV STRESS RECOVERY HR: 113 BPM
BH CV STRESS SPEED STAGE 1: 1.7
BH CV STRESS SPEED STAGE 2: 2.5
BH CV STRESS SPEED STAGE 3: 3.4
BH CV STRESS SPEED STAGE 4: 4.2
BH CV STRESS STAGE 1: 1
BH CV STRESS STAGE 2: 2
BH CV STRESS STAGE 3: 3
BH CV STRESS STAGE 4: 4
MAXIMAL PREDICTED HEART RATE: 181 BPM
PERCENT MAX PREDICTED HR: 98.9 %
STRESS BASELINE BP: NORMAL MMHG
STRESS BASELINE HR: 73 BPM
STRESS PERCENT HR: 116 %
STRESS POST ESTIMATED WORKLOAD: 10.3 METS
STRESS POST EXERCISE DUR MIN: 9 MIN
STRESS POST EXERCISE DUR SEC: 7 SEC
STRESS POST PEAK BP: NORMAL MMHG
STRESS POST PEAK HR: 179 BPM
STRESS TARGET HR: 154 BPM

## 2023-05-11 PROCEDURE — 93306 TTE W/DOPPLER COMPLETE: CPT

## 2023-05-11 PROCEDURE — 93306 TTE W/DOPPLER COMPLETE: CPT | Performed by: INTERNAL MEDICINE

## 2023-05-11 PROCEDURE — 25510000001 PERFLUTREN (DEFINITY) 8.476 MG IN SODIUM CHLORIDE (PF) 0.9 % 10 ML INJECTION

## 2023-05-11 PROCEDURE — 93017 CV STRESS TEST TRACING ONLY: CPT

## 2023-05-11 RX ADMIN — SODIUM CHLORIDE 1.5 ML: 9 INJECTION INTRAMUSCULAR; INTRAVENOUS; SUBCUTANEOUS at 12:50

## 2023-05-12 LAB
AORTIC DIMENSIONLESS INDEX: 0.7 (DI)
ASCENDING AORTA: 3.1 CM
BH CV ECHO MEAS - ACS: 2.36 CM
BH CV ECHO MEAS - AO MAX PG: 5.1 MMHG
BH CV ECHO MEAS - AO MEAN PG: 2.7 MMHG
BH CV ECHO MEAS - AO ROOT DIAM: 3.3 CM
BH CV ECHO MEAS - AO V2 MAX: 112.8 CM/SEC
BH CV ECHO MEAS - AO V2 VTI: 23.9 CM
BH CV ECHO MEAS - AVA(I,D): 3 CM2
BH CV ECHO MEAS - CONTRAST EF 4CH: 52 CM2
BH CV ECHO MEAS - EDV(CUBED): 241.7 ML
BH CV ECHO MEAS - EDV(MOD-SP2): 161 ML
BH CV ECHO MEAS - EDV(MOD-SP4): 179 ML
BH CV ECHO MEAS - EF(MOD-BP): 52.4 %
BH CV ECHO MEAS - EF(MOD-SP2): 48.4 %
BH CV ECHO MEAS - EF(MOD-SP4): 56.4 %
BH CV ECHO MEAS - ESV(CUBED): 95 ML
BH CV ECHO MEAS - ESV(MOD-SP2): 83 ML
BH CV ECHO MEAS - ESV(MOD-SP4): 78 ML
BH CV ECHO MEAS - FS: 26.7 %
BH CV ECHO MEAS - IVS/LVPW: 1.08 CM
BH CV ECHO MEAS - IVSD: 0.76 CM
BH CV ECHO MEAS - LAT PEAK E' VEL: 12.7 CM/SEC
BH CV ECHO MEAS - LV DIASTOLIC VOL/BSA (35-75): 78.3 CM2
BH CV ECHO MEAS - LV MASS(C)D: 177.8 GRAMS
BH CV ECHO MEAS - LV MAX PG: 2.05 MMHG
BH CV ECHO MEAS - LV MEAN PG: 1.22 MMHG
BH CV ECHO MEAS - LV SYSTOLIC VOL/BSA (12-30): 34.1 CM2
BH CV ECHO MEAS - LV V1 MAX: 71.6 CM/SEC
BH CV ECHO MEAS - LV V1 VTI: 16.1 CM
BH CV ECHO MEAS - LVIDD: 6.2 CM
BH CV ECHO MEAS - LVIDS: 4.6 CM
BH CV ECHO MEAS - LVOT AREA: 4.5 CM2
BH CV ECHO MEAS - LVOT DIAM: 2.39 CM
BH CV ECHO MEAS - LVPWD: 0.7 CM
BH CV ECHO MEAS - MED PEAK E' VEL: 9 CM/SEC
BH CV ECHO MEAS - MV A DUR: 0.15 SEC
BH CV ECHO MEAS - MV A MAX VEL: 45.4 CM/SEC
BH CV ECHO MEAS - MV DEC SLOPE: 446.8 CM/SEC2
BH CV ECHO MEAS - MV DEC TIME: 182 MSEC
BH CV ECHO MEAS - MV E MAX VEL: 66.8 CM/SEC
BH CV ECHO MEAS - MV E/A: 1.47
BH CV ECHO MEAS - MV MAX PG: 2.8 MMHG
BH CV ECHO MEAS - MV MEAN PG: 1.01 MMHG
BH CV ECHO MEAS - MV P1/2T: 59.1 MSEC
BH CV ECHO MEAS - MV V2 VTI: 24.7 CM
BH CV ECHO MEAS - MVA(P1/2T): 3.7 CM2
BH CV ECHO MEAS - MVA(VTI): 2.9 CM2
BH CV ECHO MEAS - PA ACC TIME: 0.11 SEC
BH CV ECHO MEAS - PA PR(ACCEL): 27.9 MMHG
BH CV ECHO MEAS - PA V2 MAX: 94.6 CM/SEC
BH CV ECHO MEAS - PULM A REVS DUR: 0.09 SEC
BH CV ECHO MEAS - PULM A REVS VEL: 26.6 CM/SEC
BH CV ECHO MEAS - PULM DIAS VEL: 47 CM/SEC
BH CV ECHO MEAS - PULM S/D: 1.33
BH CV ECHO MEAS - PULM SYS VEL: 62.5 CM/SEC
BH CV ECHO MEAS - QP/QS: 0.65
BH CV ECHO MEAS - RAP SYSTOLE: 8 MMHG
BH CV ECHO MEAS - RV MAX PG: 1.34 MMHG
BH CV ECHO MEAS - RV V1 MAX: 57.8 CM/SEC
BH CV ECHO MEAS - RV V1 VTI: 13 CM
BH CV ECHO MEAS - RVOT DIAM: 2.14 CM
BH CV ECHO MEAS - RVSP: 28.1 MMHG
BH CV ECHO MEAS - SI(MOD-SP2): 34.1 ML/M2
BH CV ECHO MEAS - SI(MOD-SP4): 44.2 ML/M2
BH CV ECHO MEAS - SV(LVOT): 71.8 ML
BH CV ECHO MEAS - SV(MOD-SP2): 78 ML
BH CV ECHO MEAS - SV(MOD-SP4): 101 ML
BH CV ECHO MEAS - SV(RVOT): 46.9 ML
BH CV ECHO MEAS - TAPSE (>1.6): 1.6 CM
BH CV ECHO MEAS - TR MAX PG: 20.1 MMHG
BH CV ECHO MEAS - TR MAX VEL: 224 CM/SEC
BH CV ECHO MEASUREMENTS AVERAGE E/E' RATIO: 6.16
BH CV VAS BP RIGHT ARM: NORMAL MMHG
BH CV XLRA - RV BASE: 2.8 CM
BH CV XLRA - RV LENGTH: 6.4 CM
BH CV XLRA - RV MID: 2.8 CM
BH CV XLRA - TDI S': 10.1 CM/SEC
LEFT ATRIUM VOLUME INDEX: 29.9 ML/M2
MAXIMAL PREDICTED HEART RATE: 181 BPM
SINUS: 3.6 CM
STJ: 3.2 CM
STRESS TARGET HR: 154 BPM

## 2023-05-15 DIAGNOSIS — Z82.49 FAMILY HISTORY OF CARDIAC DISORDER IN FATHER: ICD-10-CM

## 2023-05-15 DIAGNOSIS — I51.7 LEFT VENTRICULAR DILATION: ICD-10-CM

## 2023-05-15 DIAGNOSIS — I77.819 AORTIC ECTASIA: Primary | ICD-10-CM

## 2023-08-03 ENCOUNTER — OFFICE VISIT (OUTPATIENT)
Dept: SLEEP MEDICINE | Facility: HOSPITAL | Age: 38
End: 2023-08-03
Payer: COMMERCIAL

## 2023-08-03 VITALS
HEART RATE: 38 BPM | WEIGHT: 234 LBS | SYSTOLIC BLOOD PRESSURE: 111 MMHG | HEIGHT: 72 IN | BODY MASS INDEX: 31.69 KG/M2 | DIASTOLIC BLOOD PRESSURE: 67 MMHG | OXYGEN SATURATION: 98 %

## 2023-08-03 DIAGNOSIS — G47.30 OBSERVED SLEEP APNEA: Primary | ICD-10-CM

## 2023-08-03 DIAGNOSIS — R06.83 SNORING: ICD-10-CM

## 2023-08-03 DIAGNOSIS — G47.8 NON-RESTORATIVE SLEEP: ICD-10-CM

## 2023-08-03 DIAGNOSIS — E66.9 CLASS 1 OBESITY: ICD-10-CM

## 2023-08-03 DIAGNOSIS — G47.19 EXCESSIVE DAYTIME SLEEPINESS: ICD-10-CM

## 2023-08-03 PROBLEM — E66.811 CLASS 1 OBESITY: Status: ACTIVE | Noted: 2023-08-03

## 2023-08-03 PROCEDURE — G0463 HOSPITAL OUTPT CLINIC VISIT: HCPCS

## 2023-08-08 DIAGNOSIS — I49.3 PVC (PREMATURE VENTRICULAR CONTRACTION): Primary | ICD-10-CM

## 2023-08-08 RX ORDER — METOPROLOL SUCCINATE 25 MG/1
25 TABLET, EXTENDED RELEASE ORAL 2 TIMES DAILY
Qty: 180 TABLET | Refills: 3 | Status: SHIPPED | OUTPATIENT
Start: 2023-08-08

## 2023-08-16 ENCOUNTER — HOSPITAL ENCOUNTER (OUTPATIENT)
Dept: SLEEP MEDICINE | Facility: HOSPITAL | Age: 38
End: 2023-08-16
Payer: COMMERCIAL

## 2023-08-16 DIAGNOSIS — G47.30 OBSERVED SLEEP APNEA: ICD-10-CM

## 2023-08-16 DIAGNOSIS — R06.83 SNORING: ICD-10-CM

## 2023-08-16 DIAGNOSIS — G47.8 NON-RESTORATIVE SLEEP: ICD-10-CM

## 2023-08-16 DIAGNOSIS — G47.19 EXCESSIVE DAYTIME SLEEPINESS: ICD-10-CM

## 2023-08-16 DIAGNOSIS — E66.9 CLASS 1 OBESITY: ICD-10-CM

## 2023-08-16 PROCEDURE — 95806 SLEEP STUDY UNATT&RESP EFFT: CPT | Performed by: FAMILY MEDICINE

## 2023-08-16 PROCEDURE — 95806 SLEEP STUDY UNATT&RESP EFFT: CPT

## 2023-08-22 DIAGNOSIS — G47.8 NON-RESTORATIVE SLEEP: ICD-10-CM

## 2023-08-22 DIAGNOSIS — G47.19 EXCESSIVE DAYTIME SLEEPINESS: ICD-10-CM

## 2023-08-22 DIAGNOSIS — G47.33 SEVERE OBSTRUCTIVE SLEEP APNEA: Primary | ICD-10-CM

## 2023-08-22 DIAGNOSIS — R00.1 BRADYCARDIA: ICD-10-CM

## 2023-08-22 DIAGNOSIS — R06.83 SNORING: ICD-10-CM

## 2023-08-22 DIAGNOSIS — E66.9 CLASS 1 OBESITY: ICD-10-CM

## 2023-08-23 ENCOUNTER — TELEPHONE (OUTPATIENT)
Dept: SLEEP MEDICINE | Facility: HOSPITAL | Age: 38
End: 2023-08-23
Payer: COMMERCIAL

## 2023-09-15 DIAGNOSIS — I49.3 PVC (PREMATURE VENTRICULAR CONTRACTION): Primary | ICD-10-CM

## 2023-10-09 RX ORDER — METOPROLOL SUCCINATE 50 MG/1
50 TABLET, EXTENDED RELEASE ORAL 2 TIMES DAILY
Qty: 180 TABLET | Refills: 1 | Status: SHIPPED | OUTPATIENT
Start: 2023-10-09

## 2023-10-25 DIAGNOSIS — G47.33 OSA (OBSTRUCTIVE SLEEP APNEA): Primary | ICD-10-CM

## 2023-11-03 ENCOUNTER — TELEPHONE (OUTPATIENT)
Dept: SLEEP MEDICINE | Facility: HOSPITAL | Age: 38
End: 2023-11-03
Payer: COMMERCIAL

## 2023-11-08 ENCOUNTER — TELEPHONE (OUTPATIENT)
Dept: SLEEP MEDICINE | Facility: HOSPITAL | Age: 38
End: 2023-11-08
Payer: COMMERCIAL

## 2024-02-27 ENCOUNTER — OFFICE VISIT (OUTPATIENT)
Dept: CARDIOLOGY | Facility: CLINIC | Age: 39
End: 2024-02-27
Payer: COMMERCIAL

## 2024-02-27 VITALS
BODY MASS INDEX: 31.97 KG/M2 | HEIGHT: 72 IN | SYSTOLIC BLOOD PRESSURE: 110 MMHG | WEIGHT: 236 LBS | OXYGEN SATURATION: 98 % | DIASTOLIC BLOOD PRESSURE: 70 MMHG | HEART RATE: 54 BPM

## 2024-02-27 DIAGNOSIS — Z82.49 FAMILY HISTORY OF PREMATURE CORONARY ARTERY DISEASE: ICD-10-CM

## 2024-02-27 DIAGNOSIS — I49.3 PVC (PREMATURE VENTRICULAR CONTRACTION): Primary | ICD-10-CM

## 2024-02-27 DIAGNOSIS — G47.33 OSA (OBSTRUCTIVE SLEEP APNEA): ICD-10-CM

## 2024-02-27 PROCEDURE — 99214 OFFICE O/P EST MOD 30 MIN: CPT | Performed by: INTERNAL MEDICINE

## 2024-03-08 ENCOUNTER — HOSPITAL ENCOUNTER (OUTPATIENT)
Dept: CARDIOLOGY | Facility: HOSPITAL | Age: 39
Discharge: HOME | End: 2024-03-08
Payer: COMMERCIAL

## 2024-03-08 VITALS
WEIGHT: 235.89 LBS | DIASTOLIC BLOOD PRESSURE: 65 MMHG | BODY MASS INDEX: 31.95 KG/M2 | SYSTOLIC BLOOD PRESSURE: 113 MMHG | HEIGHT: 72 IN | OXYGEN SATURATION: 94 % | HEART RATE: 49 BPM

## 2024-03-08 DIAGNOSIS — I49.3 PVC (PREMATURE VENTRICULAR CONTRACTION): ICD-10-CM

## 2024-03-08 LAB
BH CV ECHO MEAS - EDV(CUBED): 189.9 ML
BH CV ECHO MEAS - EDV(MOD-SP2): 210 ML
BH CV ECHO MEAS - EDV(MOD-SP4): 157 ML
BH CV ECHO MEAS - EF(MOD-BP): 54.4 %
BH CV ECHO MEAS - EF(MOD-SP2): 51.9 %
BH CV ECHO MEAS - EF(MOD-SP4): 56.7 %
BH CV ECHO MEAS - ESV(CUBED): 67.7 ML
BH CV ECHO MEAS - ESV(MOD-SP2): 101 ML
BH CV ECHO MEAS - ESV(MOD-SP4): 68 ML
BH CV ECHO MEAS - FS: 29.1 %
BH CV ECHO MEAS - IVS/LVPW: 1.17 CM
BH CV ECHO MEAS - IVSD: 0.9 CM
BH CV ECHO MEAS - LV DIASTOLIC VOL/BSA (35-75): 68.7 CM2
BH CV ECHO MEAS - LV MASS(C)D: 183.1 GRAMS
BH CV ECHO MEAS - LV SYSTOLIC VOL/BSA (12-30): 29.8 CM2
BH CV ECHO MEAS - LVIDD: 5.7 CM
BH CV ECHO MEAS - LVIDS: 4.1 CM
BH CV ECHO MEAS - LVPWD: 0.77 CM
BH CV ECHO MEAS - SI(MOD-SP2): 47.7 ML/M2
BH CV ECHO MEAS - SI(MOD-SP4): 38.9 ML/M2
BH CV ECHO MEAS - SV(MOD-SP2): 109 ML
BH CV ECHO MEAS - SV(MOD-SP4): 89 ML

## 2024-03-08 PROCEDURE — 93308 TTE F-UP OR LMTD: CPT

## 2024-03-08 PROCEDURE — 25510000001 PERFLUTREN (DEFINITY) 8.476 MG IN SODIUM CHLORIDE (PF) 0.9 % 10 ML INJECTION: Performed by: INTERNAL MEDICINE

## 2024-03-08 PROCEDURE — 93325 DOPPLER ECHO COLOR FLOW MAPG: CPT

## 2024-03-08 RX ADMIN — PERFLUTREN 2 ML: 6.52 INJECTION, SUSPENSION INTRAVENOUS at 14:55

## 2024-03-21 ENCOUNTER — OFFICE VISIT (OUTPATIENT)
Dept: SLEEP MEDICINE | Facility: HOSPITAL | Age: 39
End: 2024-03-21
Payer: COMMERCIAL

## 2024-03-21 VITALS
WEIGHT: 236 LBS | DIASTOLIC BLOOD PRESSURE: 63 MMHG | OXYGEN SATURATION: 98 % | HEART RATE: 42 BPM | BODY MASS INDEX: 31.97 KG/M2 | HEIGHT: 72 IN | SYSTOLIC BLOOD PRESSURE: 117 MMHG

## 2024-03-21 DIAGNOSIS — G47.33 OSA ON CPAP: Primary | ICD-10-CM

## 2024-03-21 DIAGNOSIS — E66.9 CLASS 1 OBESITY: ICD-10-CM

## 2024-03-21 PROBLEM — G47.8 NON-RESTORATIVE SLEEP: Status: RESOLVED | Noted: 2023-08-03 | Resolved: 2024-03-21

## 2024-03-21 PROBLEM — G47.19 EXCESSIVE DAYTIME SLEEPINESS: Status: RESOLVED | Noted: 2023-08-03 | Resolved: 2024-03-21

## 2024-03-21 PROBLEM — R06.83 SNORING: Status: RESOLVED | Noted: 2023-08-03 | Resolved: 2024-03-21

## 2024-03-21 PROCEDURE — G0463 HOSPITAL OUTPT CLINIC VISIT: HCPCS

## 2024-03-21 PROCEDURE — 99213 OFFICE O/P EST LOW 20 MIN: CPT | Performed by: INTERNAL MEDICINE

## 2024-04-03 RX ORDER — METOPROLOL SUCCINATE 50 MG/1
50 TABLET, EXTENDED RELEASE ORAL 2 TIMES DAILY
Qty: 180 TABLET | Refills: 1 | Status: SHIPPED | OUTPATIENT
Start: 2024-04-03

## 2024-05-07 ENCOUNTER — OFFICE VISIT (OUTPATIENT)
Dept: INTERNAL MEDICINE | Age: 39
End: 2024-05-07
Payer: COMMERCIAL

## 2024-05-07 VITALS
DIASTOLIC BLOOD PRESSURE: 66 MMHG | OXYGEN SATURATION: 98 % | SYSTOLIC BLOOD PRESSURE: 100 MMHG | TEMPERATURE: 97.5 F | BODY MASS INDEX: 32.51 KG/M2 | HEART RATE: 42 BPM | WEIGHT: 240 LBS | HEIGHT: 72 IN

## 2024-05-07 DIAGNOSIS — E78.5 HYPERLIPIDEMIA, UNSPECIFIED HYPERLIPIDEMIA TYPE: ICD-10-CM

## 2024-05-07 DIAGNOSIS — I49.3 PVC (PREMATURE VENTRICULAR CONTRACTION): ICD-10-CM

## 2024-05-07 DIAGNOSIS — Z00.00 ANNUAL PHYSICAL EXAM: Primary | ICD-10-CM

## 2024-05-07 LAB
ALBUMIN SERPL-MCNC: 5 G/DL (ref 3.5–5.2)
ALBUMIN/GLOB SERPL: 2.4 G/DL
ALP SERPL-CCNC: 35 U/L (ref 39–117)
ALT SERPL-CCNC: 33 U/L (ref 1–41)
AST SERPL-CCNC: 22 U/L (ref 1–40)
BASOPHILS # BLD AUTO: 0.01 10*3/MM3 (ref 0–0.2)
BASOPHILS NFR BLD AUTO: 0.2 % (ref 0–1.5)
BILIRUB SERPL-MCNC: 0.4 MG/DL (ref 0–1.2)
BUN SERPL-MCNC: 15 MG/DL (ref 6–20)
BUN/CREAT SERPL: 12.1 (ref 7–25)
CALCIUM SERPL-MCNC: 9.6 MG/DL (ref 8.6–10.5)
CHLORIDE SERPL-SCNC: 103 MMOL/L (ref 98–107)
CHOLEST SERPL-MCNC: 184 MG/DL (ref 0–200)
CHOLEST/HDLC SERPL: 4.18 {RATIO}
CO2 SERPL-SCNC: 28.5 MMOL/L (ref 22–29)
CREAT SERPL-MCNC: 1.24 MG/DL (ref 0.76–1.27)
EGFRCR SERPLBLD CKD-EPI 2021: 75.4 ML/MIN/1.73
EOSINOPHIL # BLD AUTO: 0.09 10*3/MM3 (ref 0–0.4)
EOSINOPHIL NFR BLD AUTO: 1.9 % (ref 0.3–6.2)
ERYTHROCYTE [DISTWIDTH] IN BLOOD BY AUTOMATED COUNT: 13.3 % (ref 12.3–15.4)
GLOBULIN SER CALC-MCNC: 2.1 GM/DL
GLUCOSE SERPL-MCNC: 112 MG/DL (ref 65–99)
HBA1C MFR BLD: 5.6 % (ref 4.8–5.6)
HCT VFR BLD AUTO: 43.3 % (ref 37.5–51)
HDLC SERPL-MCNC: 44 MG/DL (ref 40–60)
HGB BLD-MCNC: 14.2 G/DL (ref 13–17.7)
IMM GRANULOCYTES # BLD AUTO: 0.02 10*3/MM3 (ref 0–0.05)
IMM GRANULOCYTES NFR BLD AUTO: 0.4 % (ref 0–0.5)
LDLC SERPL CALC-MCNC: 112 MG/DL (ref 0–100)
LYMPHOCYTES # BLD AUTO: 1.57 10*3/MM3 (ref 0.7–3.1)
LYMPHOCYTES NFR BLD AUTO: 32.3 % (ref 19.6–45.3)
MCH RBC QN AUTO: 28.7 PG (ref 26.6–33)
MCHC RBC AUTO-ENTMCNC: 32.8 G/DL (ref 31.5–35.7)
MCV RBC AUTO: 87.7 FL (ref 79–97)
MONOCYTES # BLD AUTO: 0.25 10*3/MM3 (ref 0.1–0.9)
MONOCYTES NFR BLD AUTO: 5.1 % (ref 5–12)
NEUTROPHILS # BLD AUTO: 2.92 10*3/MM3 (ref 1.7–7)
NEUTROPHILS NFR BLD AUTO: 60.1 % (ref 42.7–76)
NRBC BLD AUTO-RTO: 0 /100 WBC (ref 0–0.2)
PLATELET # BLD AUTO: 188 10*3/MM3 (ref 140–450)
POTASSIUM SERPL-SCNC: 4.6 MMOL/L (ref 3.5–5.2)
PROT SERPL-MCNC: 7.1 G/DL (ref 6–8.5)
RBC # BLD AUTO: 4.94 10*6/MM3 (ref 4.14–5.8)
SODIUM SERPL-SCNC: 141 MMOL/L (ref 136–145)
T4 FREE SERPL-MCNC: 0.98 NG/DL (ref 0.93–1.7)
TRIGL SERPL-MCNC: 161 MG/DL (ref 0–150)
TSH SERPL DL<=0.005 MIU/L-ACNC: 1.48 UIU/ML (ref 0.27–4.2)
UNABLE TO VOID: NORMAL
VLDLC SERPL CALC-MCNC: 28 MG/DL (ref 5–40)
WBC # BLD AUTO: 4.86 10*3/MM3 (ref 3.4–10.8)

## 2024-05-07 PROCEDURE — 99396 PREV VISIT EST AGE 40-64: CPT

## 2024-09-17 ENCOUNTER — OFFICE VISIT (OUTPATIENT)
Dept: CARDIOLOGY | Facility: CLINIC | Age: 39
End: 2024-09-17
Payer: COMMERCIAL

## 2024-09-17 VITALS
BODY MASS INDEX: 31.64 KG/M2 | DIASTOLIC BLOOD PRESSURE: 70 MMHG | WEIGHT: 233.6 LBS | HEIGHT: 72 IN | HEART RATE: 39 BPM | SYSTOLIC BLOOD PRESSURE: 130 MMHG

## 2024-09-17 DIAGNOSIS — E78.00 HYPERCHOLESTEROLEMIA: ICD-10-CM

## 2024-09-17 DIAGNOSIS — Z82.49 FAMILY HISTORY OF PREMATURE CORONARY ARTERY DISEASE: ICD-10-CM

## 2024-09-17 DIAGNOSIS — R00.1 BRADYCARDIA, SINUS: ICD-10-CM

## 2024-09-17 DIAGNOSIS — I49.3 FREQUENT UNIFOCAL PVCS: Primary | ICD-10-CM

## 2024-09-17 PROCEDURE — 99214 OFFICE O/P EST MOD 30 MIN: CPT | Performed by: INTERNAL MEDICINE

## 2024-09-17 PROCEDURE — 93000 ELECTROCARDIOGRAM COMPLETE: CPT | Performed by: INTERNAL MEDICINE

## 2024-09-17 RX ORDER — FLUTICASONE PROPIONATE 50 MCG
1 SPRAY, SUSPENSION (ML) NASAL DAILY
COMMUNITY
Start: 2012-01-01

## 2024-09-17 RX ORDER — METOPROLOL SUCCINATE 50 MG/1
25 TABLET, EXTENDED RELEASE ORAL DAILY
Start: 2024-09-17

## 2024-10-04 DIAGNOSIS — I49.3 FREQUENT UNIFOCAL PVCS: Primary | ICD-10-CM

## 2024-11-14 ENCOUNTER — HOSPITAL ENCOUNTER (OUTPATIENT)
Dept: CT IMAGING | Facility: HOSPITAL | Age: 39
Discharge: HOME | End: 2024-11-14

## 2024-11-14 ENCOUNTER — HOSPITAL ENCOUNTER (OUTPATIENT)
Dept: CARDIOLOGY | Facility: HOSPITAL | Age: 39
Discharge: HOME | End: 2024-11-14

## 2024-11-14 DIAGNOSIS — Z82.49 FAMILY HISTORY OF PREMATURE CORONARY ARTERY DISEASE: ICD-10-CM

## 2024-11-14 DIAGNOSIS — E78.00 HYPERCHOLESTEROLEMIA: ICD-10-CM

## 2024-11-14 PROCEDURE — 75571 CT HRT W/O DYE W/CA TEST: CPT

## 2024-11-14 PROCEDURE — 93799 UNLISTED CV SVC/PROCEDURE: CPT

## 2024-11-14 NOTE — PROGRESS NOTES
Please notify Anil he does not have evidence for plaque in the carotid as well as coronary arteries and has normal blood flow to the legs.  Can you ask how his blood pressure and heart rate has been and if fatigue resolves?  Follow-up in 1 year otherwise.  Let me know if he has any questions for me.  Thank you

## 2024-11-15 ENCOUNTER — TELEPHONE (OUTPATIENT)
Dept: CARDIOLOGY | Facility: CLINIC | Age: 39
End: 2024-11-15
Payer: COMMERCIAL

## 2024-11-15 LAB
BH CV VAS SCREENING CAROTID CCA LEFT: 118 CM/SEC
BH CV VAS SCREENING CAROTID CCA RIGHT: 102 CM/SEC
BH CV VAS SCREENING CAROTID ICA LEFT: 123 CM/SEC
BH CV VAS SCREENING CAROTID ICA RIGHT: 98 CM/SEC
BH CV XLRA MEAS - MID AO DIAM: 2 CM
BH CV XLRA MEAS - PAD LEFT ABI PT: 1.32
BH CV XLRA MEAS - PAD LEFT ARM: 106 MMHG
BH CV XLRA MEAS - PAD LEFT LEG PT: 140 MMHG
BH CV XLRA MEAS - PAD RIGHT ABI PT: 1.21
BH CV XLRA MEAS - PAD RIGHT ARM: 104 MMHG
BH CV XLRA MEAS - PAD RIGHT LEG PT: 128 MMHG
BH CV XLRA MEAS LEFT DIST CCA EDV: -31.7 CM/SEC
BH CV XLRA MEAS LEFT DIST CCA PSV: -118 CM/SEC
BH CV XLRA MEAS LEFT ICA/CCA RATIO: 1
BH CV XLRA MEAS LEFT PROX ICA EDV: -31.1 CM/SEC
BH CV XLRA MEAS LEFT PROX ICA PSV: -123 CM/SEC
BH CV XLRA MEAS RIGHT DIST CCA EDV: -22.4 CM/SEC
BH CV XLRA MEAS RIGHT DIST CCA PSV: -102 CM/SEC
BH CV XLRA MEAS RIGHT ICA/CCA RATIO: 1
BH CV XLRA MEAS RIGHT PROX ICA EDV: -23.6 CM/SEC
BH CV XLRA MEAS RIGHT PROX ICA PSV: -97.5 CM/SEC

## 2024-11-15 NOTE — TELEPHONE ENCOUNTER
----- Message from Rancho Salgadorashaun sent at 11/14/2024  4:34 PM EST -----  Please notify Anil he does not have evidence for plaque in the carotid as well as coronary arteries and has normal blood flow to the legs.  Can you ask how his blood pressure and heart rate has been and if fatigue resolves?  Follow-up in 1 year otherwise.  Let me know if he has any questions for me.  Thank you

## 2024-11-15 NOTE — PROGRESS NOTES
Please see telephone note.    Thank you,  Veda PAGE RN  Triage Nurse [facility]IGU[facility]  11/15/24  08:41 EST

## 2024-11-15 NOTE — TELEPHONE ENCOUNTER
"Reviewed results and recommendations with Toy Gonzalez.  Patient verbalized understanding of results and recommendations.    Patient reports his blood pressure is \"fine\", with readings usually in the \"110's/70's\".  He reports his HR has not changed much.  Average resting HR: 48, Average HR: 60's-70's.  Patient states his fatigue has not really improved, but that he thinks he is \"overdoing it\".      Please let me know if there is anything else you would like me to do for this patient.      Thank you,  Veda PAGE RN  T[place] Nurse [facility] [place]  11/15/24    08:41 EST   "

## 2024-11-20 DIAGNOSIS — I49.3 PVC (PREMATURE VENTRICULAR CONTRACTION): ICD-10-CM

## 2024-11-20 DIAGNOSIS — E78.00 HYPERCHOLESTEROLEMIA: Primary | ICD-10-CM

## 2024-11-20 DIAGNOSIS — G47.33 OSA (OBSTRUCTIVE SLEEP APNEA): ICD-10-CM

## 2024-11-20 DIAGNOSIS — R00.2 PALPITATIONS: ICD-10-CM

## 2024-11-20 DIAGNOSIS — I49.3 FREQUENT UNIFOCAL PVCS: ICD-10-CM

## 2024-11-20 DIAGNOSIS — Z82.49 FAMILY HISTORY OF PREMATURE CORONARY ARTERY DISEASE: ICD-10-CM

## 2024-11-20 DIAGNOSIS — R00.1 BRADYCARDIA, SINUS: ICD-10-CM

## 2024-11-20 NOTE — TELEPHONE ENCOUNTER
Reviewed recommendations with Toy Gonzalez.  Patient reports he actually wore a monitor recently as well.  Chart review shows monitor was worn 11/4 to 11/6, but it does not appear that patient has received result for this test.    Please let me know how you would like to proceed.    Thank you,  Veda PAGE RN  Triage Nurse [facility] [place] [place]  11/20/24  10:46 EST

## 2024-11-20 NOTE — TELEPHONE ENCOUNTER
Reviewed recommendations with Toy Gonzalez.  Patient reports he is no longer taking metoprolol and that metoprolol was discontinued by Dr. Angel in September/October.  Patient reports he did notice an increase in his heart rate after stopping metoprolol.  Chart review shows the following:        Thank you,  Veda PAGE RN  Triage Nurse [facility] [place]  11/20/24  09:03 EST

## 2024-11-26 NOTE — TELEPHONE ENCOUNTER
Called and left VM. Will continue to try to reach patient. HUB transfer call to triage.     Celestina Sweet RN  [place] [facility] [place]

## 2024-11-26 NOTE — TELEPHONE ENCOUNTER
Notified patient of results/recommendations. Patient verbalized understanding.    Celsetina Sweet RN  Triage [facility] [place]

## 2025-03-20 ENCOUNTER — OFFICE VISIT (OUTPATIENT)
Dept: SLEEP MEDICINE | Facility: HOSPITAL | Age: 40
End: 2025-03-20
Payer: COMMERCIAL

## 2025-03-20 VITALS
WEIGHT: 233 LBS | DIASTOLIC BLOOD PRESSURE: 66 MMHG | OXYGEN SATURATION: 97 % | HEART RATE: 54 BPM | SYSTOLIC BLOOD PRESSURE: 116 MMHG | BODY MASS INDEX: 31.56 KG/M2 | HEIGHT: 72 IN

## 2025-03-20 DIAGNOSIS — G47.33 OSA ON CPAP: Primary | ICD-10-CM

## 2025-03-20 DIAGNOSIS — E66.811 CLASS 1 OBESITY: ICD-10-CM

## 2025-03-20 PROCEDURE — G0463 HOSPITAL OUTPT CLINIC VISIT: HCPCS

## 2025-03-20 PROCEDURE — 99213 OFFICE O/P EST LOW 20 MIN: CPT | Performed by: INTERNAL MEDICINE

## 2025-03-20 NOTE — PROGRESS NOTES
"  [facility]  Sleep medicine  [address]  [address]  [place] [address]  Phone   Fax       SLEEP CLINIC FOLLOW UP PROGRESS NOTE.    Toy Gonzalez  [id number]   1985  40 y.o.  male      PCP: Pili Britt APRN      Date of visit: 3/20/2025    Chief Complaint   Patient presents with    Sleep Apnea    Obesity       HPI:  This is a 40 y.o. years old patient is here for the management of obstructive sleep apnea.  Sleep apnea is severe in severity with a AHI of 37/hr. Patient is using positive airway pressure therapy with auto CPAP and the symptoms of sleep apnea have improved significantly on the therapy. Normally patient goes to bed at 10 PM and wakes up at 6 AM .  The patient wakes up 2-3 time(s) during the night and has no problem going back to sleep.  Feels refreshed after waking up.  He states that the PVC's have improved significantly.     Medications and allergies are reviewed by me and documented in the encounter.     SOCIAL (habits pertaining to sleep medicine)  History tobacco use:No   History of alcohol use: 3 per week  Caffeine use: 3     REVIEW OF SYSTEMS:   Pertaining positive symptoms are:  [place] Sleepiness Scale :Total score: 11         PHYSICAL EXAMINATION:  CONSTITUTIONAL:  Vitals:    03/20/25 0827   BP: 116/66   Pulse: 54   SpO2: 97%   Weight: 106 kg (233 lb)   Height: 182.9 cm (72\")    Body mass index is 31.6 kg/m².   NOSE: nasal passages are clear, No deformities noted   RESP SYSTEM: Not in any respiratory distress, no chest deformities noted,   CARDIOVASULAR: No edema noted  NEURO: Oriented x 3, gait normal,  Mood and affect appeared appropriate      Data reviewed:  The Smart card downloaded on 3/20/2025 has been reviewed independently by me for compliance and discussed the data with the patient.   Compliance; 90%  More than 4 hr use, 80%  Average use of the device 6 hours and 23-minute per night  Residual AHI: 2.1 /hr (goal < 5.0 /hr)  Mask " type: Nasal pillows  Device: ResMed  DME: [employer]      ASSESSMENT AND PLAN:  Obstructive sleep apnea ( G 47.33).  The symptoms of sleep apnea have improved with the device and the treatment.  Patient's compliance with the device is excellent for treatment of sleep apnea.  I have independently reviewed the smart card down load and discussed with the patient the download data and encouarged the patient to continue to use the device.The residual AHI is acceptable. The device is benefiting the patient and the device is medically necessary.  Without proper control of sleep apnea and good compliance there is a increased risk for hypertension, diabetes mellitus and nonrestorative sleep with hypersomnia which can increase risk for motor vehicle accidents.  Untreated sleep apnea is also a risk factor for development of atrial fibrillation, pulmonary hypertension, insulin resistance and stroke. The patient is also instructed to get the supplies from the DME company and and change them on a regular basis.  A prescription for supplies has been sent to the DME company.  I have also discussed the good sleep hygiene habits and adequate amount of sleep needed for good health.  I have instructed the patient to increase sleep time  Obesity  1 with BMI is Body mass index is 31.6 kg/m².. I have discuss the relationship between the weight and sleep apnea. The benefit of weight loss in reducing severity of sleep apnea was discussed. Discussed diet and exercise with the patient to achieve ideal BMI.   Return in about 1 year (around 3/20/2026) for with smart card down load. . Patient's questions were answered.    3/20/2025  Brenda Mcdaniels MD  Sleep Medicine.  Medical Director,   [facility], [facility] sleep [facility].

## 2025-05-08 NOTE — PROGRESS NOTES
"    I N T E R N A L  M E D I C I N E  Pili Lorenza, APRVITALY      ENCOUNTER DATE:  05/09/2025    Toy Carlos / 40 y.o. / male    CHIEF COMPLAINT     Annual Exam    Doing well.  No problems or concerns.    Last seen May 2024.    History of allergic rhinitis/ Asthma:  Well controlled.  No need for albuterol inhaler in last few years.  Continues on daily Zyrtec, Flonase.       Followed by cardiology, Dr. Angel, for bradycardia; next appointment is September 19, 2025.  Denies any chest pain, dyspnea, palpitations.     TARIQ: Trying to wear CPAP for at least 4 hours nightly.       HLD: May 2024 lipid panel with elevated ; elevated triglycerides 161.    VITALS     Visit Vitals  /68   Pulse 80   Temp 97.8 °F (36.6 °C)   Resp 16   Ht 182.9 cm (72.01\")   Wt 105 kg (232 lb)   SpO2 98%   BMI 31.46 kg/m²       BP Readings from Last 3 Encounters:   05/09/25 110/68   03/20/25 116/66   09/17/24 130/70     Wt Readings from Last 3 Encounters:   05/09/25 105 kg (232 lb)   03/20/25 106 kg (233 lb)   09/17/24 106 kg (233 lb 9.6 oz)      Body mass index is 31.46 kg/m².      MEDICATIONS     Current Outpatient Medications on File Prior to Visit   Medication Sig Dispense Refill    cetirizine (zyrTEC) 10 MG tablet Take 1 tablet by mouth Daily.      fluticasone (FLONASE) 50 MCG/ACT nasal spray Administer 1 spray into the nostril(s) as directed by provider Daily.      albuterol (PROVENTIL HFA;VENTOLIN HFA) 108 (90 Base) MCG/ACT inhaler Inhale 2 puffs Every 4 (Four) Hours As Needed for Wheezing or Shortness of Air. (Patient not taking: Reported on 5/9/2025) 1 inhaler 0     No current facility-administered medications on file prior to visit.         HISTORY OF PRESENT ILLNESS      Toy presents for annual health maintenance visit.    General health: good  Lifestyle:  Attempting to lose weight?: Yes   Diet: eats moderately healthy  Exercise: exercises rarely, plays basketball and hiking  Tobacco: Never used   Alcohol: 1 day/week " and 2 drinks/occasion, beer  Work: Full-time  Reproductive health:  Sexually active?: Yes   Concern for STD?: No   Sexual problems?: No problems   Sees Urologist?: No   Depression Screenin/9/2025     8:08 AM   PHQ-2/PHQ-9 Depression Screening   Little interest or pleasure in doing things Not at all   Feeling down, depressed, or hopeless Not at all   How difficult have these problems made it for you to do your work, take care of things at home, or get along with other people? Not difficult at all         PHQ-2: 0 (Not depressed)     PHQ-9: 0 (Negative screening for depression)    Patient Care Team:  Pili Britt APRN as PCP - General (Family Medicine)  Rancho Angel MD as Consulting Physician (Cardiology)  ______________________________________________________________________    ALLERGIES  No Known Allergies     PFSH:     The following portions of the patient's history were reviewed and updated as appropriate: Allergies / Current Medications / Past Medical History / Surgical History / Social History / Family History    PROBLEM LIST   Patient Active Problem List   Diagnosis    Allergic rhinitis    TARIQ on CPAP    Class 1 obesity    Frequent unifocal PVCs    Family history of premature coronary artery disease    Hypercholesterolemia    Bradycardia, sinus       PAST MEDICAL HISTORY  Past Medical History:   Diagnosis Date    Allergic     Asthma        SURGICAL HISTORY  Past Surgical History:   Procedure Laterality Date    ORIF WRIST FRACTURE      VASECTOMY         SOCIAL HISTORY  Social History     Socioeconomic History    Marital status:     Number of children: 2   Tobacco Use    Smoking status: Former     Current packs/day: 0.00     Types: Cigarettes     Quit date:      Years since quittin.3    Smokeless tobacco: Never    Tobacco comments:     Caffeine use   Vaping Use    Vaping status: Never Used   Substance and Sexual Activity    Alcohol use: Yes     Alcohol/week: 2.0 standard  drinks of alcohol     Types: 2 Cans of beer per week     Comment: Weekly    Drug use: Never    Sexual activity: Yes     Partners: Female     Birth control/protection: Surgical       FAMILY HISTORY  Family History   Problem Relation Age of Onset    Hypertension Mother     Diabetes Mother     Heart disease Mother     Skin cancer Mother     Fibromyalgia Mother     Thyroid disease Mother     Arthritis Mother     Cancer Mother     Asthma Mother     Osteoarthritis Mother     Heart disease Father     Congenital heart disease Father     Heart attack Father     Rheum arthritis Sister     Heart disease Sister     No Known Problems Brother     Diabetes Maternal Grandmother     Breast cancer Maternal Grandmother     Diabetes Maternal Grandfather     Diabetes Paternal Grandmother     Diabetes Paternal Grandfather        IMMUNIZATION HISTORY  Immunization History   Administered Date(s) Administered    COVID-19 (PFIZER) 12YRS+ (COMIRNATY) 09/25/2023    COVID-19 (PFIZER) BIVALENT 12+YRS 04/28/2023    COVID-19 (PFIZER) Purple Cap Monovalent 04/11/2021, 05/02/2021, 12/04/2021    Flu Vaccine Quad PF >36MO 10/16/2020    Fluzone (or Fluarix & Flulaval for VFC) >6mos 10/16/2020    Hep B, Adolescent or Pediatric 07/30/1999, 02/02/2000    Hepatitis A 05/09/2018, 11/07/2018    Hepatitis B Adolescent High Risk Infant 09/01/1999    Hepatitis B Adult/Adolescent IM 09/01/1999    Td (TDVAX) 07/30/1999    Tdap 04/28/2023         REVIEW OF SYSTEMS     Review of Systems   Constitutional:  Negative for chills, fever and unexpected weight change.   Respiratory:  Negative for cough, chest tightness and shortness of breath.    Cardiovascular:  Negative for chest pain, palpitations and leg swelling.   Neurological:  Negative for dizziness, weakness, light-headedness and headaches.   Psychiatric/Behavioral:  The patient is not nervous/anxious.        PHYSICAL EXAMINATION     Physical Exam  Vitals reviewed.   Constitutional:       General: He is not in  acute distress.     Appearance: Normal appearance. He is not ill-appearing, toxic-appearing or diaphoretic.   HENT:      Head: Normocephalic and atraumatic.      Right Ear: Tympanic membrane, ear canal and external ear normal. There is no impacted cerumen.      Left Ear: Tympanic membrane, ear canal and external ear normal. There is no impacted cerumen.      Nose: Nose normal. No congestion or rhinorrhea.      Mouth/Throat:      Mouth: Mucous membranes are moist.      Pharynx: Oropharynx is clear. No oropharyngeal exudate or posterior oropharyngeal erythema.   Eyes:      Extraocular Movements: Extraocular movements intact.      Conjunctiva/sclera: Conjunctivae normal.      Pupils: Pupils are equal, round, and reactive to light.   Cardiovascular:      Rate and Rhythm: Normal rate and regular rhythm.      Heart sounds: Normal heart sounds.   Pulmonary:      Effort: Pulmonary effort is normal. No respiratory distress.      Breath sounds: Normal breath sounds.   Abdominal:      General: Bowel sounds are normal.      Palpations: Abdomen is soft.      Tenderness: There is no abdominal tenderness.   Musculoskeletal:         General: Normal range of motion.      Cervical back: Normal range of motion and neck supple.      Right lower leg: No edema.      Left lower leg: No edema.   Lymphadenopathy:      Cervical: No cervical adenopathy.   Skin:     General: Skin is warm and dry.   Neurological:      General: No focal deficit present.      Mental Status: He is alert and oriented to person, place, and time. Mental status is at baseline.   Psychiatric:         Mood and Affect: Mood normal.         Behavior: Behavior normal.         Thought Content: Thought content normal.         Judgment: Judgment normal.         REVIEWED DATA      Labs:    Lab Results   Component Value Date     05/07/2024    K 4.6 05/07/2024    CALCIUM 9.6 05/07/2024    AST 22 05/07/2024    ALT 33 05/07/2024    BUN 15 05/07/2024    CREATININE 1.24  "05/07/2024    CREATININE 1.17 04/28/2023    CREATININE 1.17 03/18/2022    EGFRIFNONA 91 02/17/2020       Lab Results   Component Value Date    GLUCOSE 112 (H) 05/07/2024    HGBA1C 5.60 05/07/2024    HGBA1C 5.6 04/28/2023    HGBA1C 5.60 03/18/2022    TSH 1.480 05/07/2024    FREET4 0.98 05/07/2024       No results found for: \"PSA\"    [unfilled]    Lab Results   Component Value Date     (H) 05/07/2024    HDL 44 05/07/2024    TRIG 161 (H) 05/07/2024    CHOLHDLRATIO 4.18 05/07/2024       No components found for: \"KLJJ648V\"    Lab Results   Component Value Date    WBC 4.86 05/07/2024    HGB 14.2 05/07/2024    MCV 87.7 05/07/2024     05/07/2024       Lab Results   Component Value Date    PROTEIN CANCELED 04/28/2023    GLUCOSEU CANCELED 04/28/2023          Lab Results   Component Value Date    HEPCVIRUSABY Non-Reactive 12/05/2017       Imaging:           Medical Tests:           ASSESSMENT & PLAN     ANNUAL WELLNESS EXAM / PHYSICAL     Diagnoses and all orders for this visit:    1. Annual physical exam (Primary)  -     CBC & Differential  -     Comprehensive Metabolic Panel  -     Hemoglobin A1c  -     Lipid Panel With / Chol / HDL Ratio  -     TSH+Free T4  -     Urinalysis With Microscopic If Indicated (No Culture) - Urine, Clean Catch    2. Hyperlipidemia, unspecified hyperlipidemia type         Summary/Discussion:     Encouraged low fat diet, regular exercise.  He will update COVID-19 vaccine through pharmacy.    Next Appointment with me: Visit date not found    Return in about 1 year (around 5/9/2026) for Annual physical.      HEALTHCARE MAINTENANCE ISSUES       Cancer Screening:  Colon: Initial/Next screening at age: 44  Repeat colon cancer screening: N/A at this time  Prostate: No screening needed at this time  Testicular: Recommended monthly self exam  Skin: Monthly self skin examination, annual exam by health professional  Lung: Does not meet criteria for lung cancer screening.   Other:    Screening Labs " & Tests:  Lab results reviewed & discussed with with patient or orders placed today.  EKG:  CV Screening: Lipid panel  DEXA (75+ or risk factors):   HEP C (If born [id number] or risk factors): Previously had negative screen  Other:     Immunization/Vaccinations (to be given today unless deferred by patient)  Influenza: Recommended annual influenza vaccine  Hepatitis A: Verify immunization records  Hepatitis B: Verify immunization records  Tetanus/Pertussis: Up to date  Pneumovax/PCV: Not needed at this time  Shingles: Not needed at this time  COVID: Considering the latest booster   Lifestyle Counseling:  Lifestyle Modifications: Attempt to lose weight, Improve dietary compliance, Begin progressive aerobic exercise program 3-5 days a week, Follow a low fat, low cholesterol diet, and Reduce exposure to stress if possible  Safety Issues: Always wear seatbelt, Avoid texting while driving   Use sunscreen, regular skin examination  Recommended annual dental/vision examination.  Emotional/Stress/Sleep: Reviewed and  given when appropriate      Health Maintenance   Topic Date Due    COVID-19 Vaccine (6 - 2024-25 season) 09/01/2024    LIPID PANEL  05/07/2025    INFLUENZA VACCINE  07/01/2025    ANNUAL PHYSICAL  05/09/2026    TDAP/TD VACCINES (3 - Td or Tdap) 04/28/2033    HEPATITIS C SCREENING  Completed    Pneumococcal Vaccine 0-49  Aged Out

## 2025-05-09 ENCOUNTER — OFFICE VISIT (OUTPATIENT)
Dept: INTERNAL MEDICINE | Age: 40
End: 2025-05-09
Payer: COMMERCIAL

## 2025-05-09 VITALS
HEART RATE: 80 BPM | RESPIRATION RATE: 16 BRPM | SYSTOLIC BLOOD PRESSURE: 110 MMHG | HEIGHT: 72 IN | DIASTOLIC BLOOD PRESSURE: 68 MMHG | WEIGHT: 232 LBS | BODY MASS INDEX: 31.42 KG/M2 | TEMPERATURE: 97.8 F | OXYGEN SATURATION: 98 %

## 2025-05-09 DIAGNOSIS — E78.5 HYPERLIPIDEMIA, UNSPECIFIED HYPERLIPIDEMIA TYPE: ICD-10-CM

## 2025-05-09 DIAGNOSIS — Z00.00 ANNUAL PHYSICAL EXAM: Primary | ICD-10-CM

## 2025-05-10 LAB
ALBUMIN SERPL-MCNC: 4.8 G/DL (ref 4.1–5.1)
ALP SERPL-CCNC: 41 IU/L (ref 44–121)
ALT SERPL-CCNC: 35 IU/L (ref 0–44)
AST SERPL-CCNC: 21 IU/L (ref 0–40)
BASOPHILS # BLD AUTO: 0 X10E3/UL (ref 0–0.2)
BASOPHILS NFR BLD AUTO: 1 %
BILIRUB SERPL-MCNC: 0.3 MG/DL (ref 0–1.2)
BUN SERPL-MCNC: 11 MG/DL (ref 6–24)
BUN/CREAT SERPL: 10 (ref 9–20)
CALCIUM SERPL-MCNC: 9.7 MG/DL (ref 8.7–10.2)
CHLORIDE SERPL-SCNC: 103 MMOL/L (ref 96–106)
CHOLEST SERPL-MCNC: 176 MG/DL (ref 100–199)
CHOLEST/HDLC SERPL: 4.3 RATIO (ref 0–5)
CO2 SERPL-SCNC: 25 MMOL/L (ref 20–29)
CREAT SERPL-MCNC: 1.06 MG/DL (ref 0.76–1.27)
EGFRCR SERPLBLD CKD-EPI 2021: 90 ML/MIN/1.73
EOSINOPHIL # BLD AUTO: 0.1 X10E3/UL (ref 0–0.4)
EOSINOPHIL NFR BLD AUTO: 2 %
ERYTHROCYTE [DISTWIDTH] IN BLOOD BY AUTOMATED COUNT: 13.2 % (ref 11.6–15.4)
GLOBULIN SER CALC-MCNC: 2.5 G/DL (ref 1.5–4.5)
GLUCOSE SERPL-MCNC: 105 MG/DL (ref 70–99)
GLUCOSE UR QL STRIP: NORMAL
HBA1C MFR BLD: 5.7 % (ref 4.8–5.6)
HCT VFR BLD AUTO: 46.5 % (ref 37.5–51)
HDLC SERPL-MCNC: 41 MG/DL
HGB BLD-MCNC: 15.3 G/DL (ref 13–17.7)
IMM GRANULOCYTES # BLD AUTO: 0 X10E3/UL (ref 0–0.1)
IMM GRANULOCYTES NFR BLD AUTO: 0 %
KETONES UR QL STRIP: NORMAL
LDLC SERPL CALC-MCNC: 98 MG/DL (ref 0–99)
LYMPHOCYTES # BLD AUTO: 1.7 X10E3/UL (ref 0.7–3.1)
LYMPHOCYTES NFR BLD AUTO: 30 %
MCH RBC QN AUTO: 29 PG (ref 26.6–33)
MCHC RBC AUTO-ENTMCNC: 32.9 G/DL (ref 31.5–35.7)
MCV RBC AUTO: 88 FL (ref 79–97)
MONOCYTES # BLD AUTO: 0.3 X10E3/UL (ref 0.1–0.9)
MONOCYTES NFR BLD AUTO: 6 %
NEUTROPHILS # BLD AUTO: 3.5 X10E3/UL (ref 1.4–7)
NEUTROPHILS NFR BLD AUTO: 61 %
PH UR STRIP: NORMAL [PH]
PLATELET # BLD AUTO: 218 X10E3/UL (ref 150–450)
POTASSIUM SERPL-SCNC: 4.6 MMOL/L (ref 3.5–5.2)
PROT SERPL-MCNC: 7.3 G/DL (ref 6–8.5)
PROT UR QL STRIP: NORMAL
RBC # BLD AUTO: 5.28 X10E6/UL (ref 4.14–5.8)
SODIUM SERPL-SCNC: 143 MMOL/L (ref 134–144)
SP GR UR STRIP: NORMAL
SPECIMEN STATUS: NORMAL
T4 FREE SERPL-MCNC: 1.12 NG/DL (ref 0.82–1.77)
TRIGL SERPL-MCNC: 219 MG/DL (ref 0–149)
TSH SERPL DL<=0.005 MIU/L-ACNC: 2.09 UIU/ML (ref 0.45–4.5)
UNABLE TO VOID: NORMAL
VLDLC SERPL CALC-MCNC: 37 MG/DL (ref 5–40)
WBC # BLD AUTO: 5.6 X10E3/UL (ref 3.4–10.8)